# Patient Record
Sex: FEMALE | Race: BLACK OR AFRICAN AMERICAN | Employment: FULL TIME | URBAN - METROPOLITAN AREA
[De-identification: names, ages, dates, MRNs, and addresses within clinical notes are randomized per-mention and may not be internally consistent; named-entity substitution may affect disease eponyms.]

---

## 2019-02-01 ENCOUNTER — HOSPITAL ENCOUNTER (EMERGENCY)
Age: 64
Discharge: HOME | End: 2019-02-02
Attending: EMERGENCY MEDICINE
Payer: COMMERCIAL

## 2019-02-01 DIAGNOSIS — R04.0 ANTERIOR EPISTAXIS: Primary | ICD-10-CM

## 2019-02-01 PROCEDURE — 99283 EMERGENCY DEPT VISIT LOW MDM: CPT | Performed by: EMERGENCY MEDICINE

## 2019-02-02 VITALS
OXYGEN SATURATION: 100 % | DIASTOLIC BLOOD PRESSURE: 85 MMHG | HEIGHT: 61 IN | SYSTOLIC BLOOD PRESSURE: 136 MMHG | BODY MASS INDEX: 26.43 KG/M2 | RESPIRATION RATE: 16 BRPM | WEIGHT: 140 LBS | TEMPERATURE: 97.5 F | HEART RATE: 64 BPM

## 2019-02-02 NOTE — ED TRIAGE NOTES
Pt states she has had a nose bleed, on left side for 40 minutes. Pt is also complaining of left sided headache.

## 2019-02-02 NOTE — DISCHARGE INSTRUCTIONS
Humidifier. Consider salt water nasal spray. Apply small amount of antibiotic ointment twice a day to cartilage of left nostril. Consider calling her nose and throat doctor for recheck to rule out unusual causes of bleeding. Patient Education        Nosebleeds: Care Instructions  Your Care Instructions    Nosebleeds are common, especially if you have colds or allergies. Many things can cause a nosebleed. Some nosebleeds stop on their own with pressure. Others need packing. Some get cauterized (sealed). If you have gauze or other packing materials in your nose, you will need to follow up with your doctor to have the packing removed. You may need more treatment if you get nosebleeds a lot. The doctor has checked you carefully, but problems can develop later. If you notice any problems or new symptoms, get medical treatment right away. Follow-up care is a key part of your treatment and safety. Be sure to make and go to all appointments, and call your doctor if you are having problems. It's also a good idea to know your test results and keep a list of the medicines you take. How can you care for yourself at home? · If you get another nosebleed:  ? Sit up and tilt your head slightly forward. This keeps blood from going down your throat. ? Use your thumb and index finger to pinch your nose shut for 10 minutes. Use a clock. Do not check to see if the bleeding has stopped before the 10 minutes are up. If the bleeding has not stopped, pinch your nose shut for another 10 minutes. ? When the bleeding has stopped, try not to pick, rub, or blow your nose for 12 hours. Avoiding these things helps keep your nose from bleeding again. · If your doctor prescribed antibiotics, take them as directed. Do not stop taking them just because you feel better. You need to take the full course of antibiotics. To prevent nosebleeds  · Do not blow your nose too hard. · Try not to lift or strain after a nosebleed.   · Raise your head on a pillow while you sleep. · Put a thin layer of a saline- or water-based nasal gel, such as NasoGel, inside your nose. Put it on the septum, which divides your nostrils. This will prevent dryness that can cause nosebleeds. · Use a vaporizer or humidifier to add moisture to your bedroom. Follow the directions for cleaning the machine. · Do not use aspirin, ibuprofen (Advil, Motrin), or naproxen (Aleve) for 36 to 48 hours after a nosebleed unless your doctor tells you to. You can use acetaminophen (Tylenol) for pain relief. · Talk to your doctor about stopping any other medicines you are taking. Some medicines may make you more likely to get a nosebleed. · Do not use cold medicines or nasal sprays without first talking to your doctor. They can make your nose dry. When should you call for help? Call 911 anytime you think you may need emergency care. For example, call if:    · You passed out (lost consciousness).    Call your doctor now or seek immediate medical care if:    · You get another nosebleed and your nose is still bleeding after you have applied pressure 3 times for 10 minutes each time (30 minutes total).     · There is a lot of blood running down the back of your throat even after you pinch your nose and tilt your head forward.     · You have a fever.     · You have sinus pain.    Watch closely for changes in your health, and be sure to contact your doctor if:    · You get nosebleeds often, even if they stop.     · You do not get better as expected. Where can you learn more? Go to http://ade-kael.info/. Enter S156 in the search box to learn more about \"Nosebleeds: Care Instructions. \"  Current as of: September 23, 2018  Content Version: 11.9  © 1353-8805 [employer]. Care instructions adapted under license by [employer] (which disclaims liability or warranty for this information).  If you have questions about a medical condition or this instruction, always ask your healthcare professional. [place] [address] any warranty or liability for your use of this information.

## 2019-02-02 NOTE — ED PROVIDER NOTES
75-year-old female takes occasional Motrin for knee pain. No other anticoagulants. Noticed some irritation of her left nose and had some bleeding those described as dark red this evening. No posterior bleeding no cough. No persistent bleeding. Some occasional left throbbing headache. She's had occasional bouts of nosebleeds on and off for the last few months. Does have a history of hypertension. States taking medications at this point. Bleeding is always on the left. The history is provided by the patient. Epistaxis This is a new problem. The current episode started 3 to 5 hours ago. The problem has been resolved. The bleeding has been from the left nare. Her past medical history is significant for frequent nosebleeds and HTN. Her past medical history does not include bleeding disorder or sinus problems. Past Medical History:  
Diagnosis Date  Diabetes mellitus ([address] 2014  
 not on medications hGB a1C 6.5  Endometriosis   
 total hysterectomy w/o BSO  Hyperlipidemia  Hypertension   
 not taking medication Past Surgical History:  
Procedure Laterality Date  BREAST SURGERY PROCEDURE UNLISTED    
 benign lump [facility] Total for endometriosis [address] ovaries intact Family History:  
Problem Relation Age of Onset [facility] Arthritis-osteo Mother  No Known Problems Father  No Known Problems Sister  No Known Problems Brother  No Known Problems Brother  Breast Cancer Neg Hx   
 Ovarian Cancer Neg Hx  Uterine Cancer Neg Hx  Colon Cancer Neg Hx Social History Socioeconomic History  Marital status: SINGLE Spouse name: Not on file  Number of children: Not on file  Years of education: Not on file  Highest education level: Not on file Social Needs  Financial resource strain: Not on file  Food insecurity - worry: Not on file  Food insecurity - inability: Not on file  Transportation needs - medical: Not on file  Transportation needs - non-medical: Not on file Occupational History  Not on file Tobacco Use  Smoking status: Never Smoker  Smokeless tobacco: Never Used Substance and Sexual Activity  Alcohol use: No  
 Drug use: No  
 Sexual activity: Not Currently Other Topics Concern  Not on file Social History Narrative  Not on file ALLERGIES: Patient has no known allergies. Review of Systems Constitutional: Negative for chills and fever. HENT: Positive for nosebleeds. Negative for rhinorrhea, sinus pressure and sore throat. Respiratory: Negative for cough and choking. Hematological: Does not bruise/bleed easily. Vitals:  
 02/01/19 2158 02/01/19 2159 BP: 147/79 Pulse: 74 Resp: 16 Temp: 97.5 °F (36.4 °C) SpO2: 100% Weight:  63.5 kg (140 lb) Height:  5' 1\" (1.549 m) Physical Exam  
Constitutional: She appears well-developed and well-nourished. No distress. HENT:  
Nose: Epistaxis is observed. Right sinus exhibits no maxillary sinus tenderness and no frontal sinus tenderness. Left sinus exhibits no maxillary sinus tenderness and no frontal sinus tenderness. Left nasal septum with punctate area of minimal bleeding. No purulent drainage. No masses. Bleeding essentially stopped. Neosporin was applied. Eyes: EOM are normal. Pupils are equal, round, and reactive to light. Nursing note and vitals reviewed. MDM Number of Diagnoses or Management Options Diagnosis management comments: Do not believe cautery needed. Believe headache is benign. We'll refer to ENT for follow-up. Risk of Complications, Morbidity, and/or Mortality Presenting problems: low Diagnostic procedures: minimal 
Management options: low Patient Progress Patient progress: stable Procedures

## 2021-02-15 ENCOUNTER — TRANSCRIBE ORDER (OUTPATIENT)
Dept: SCHEDULING | Age: 66
End: 2021-02-15

## 2021-02-15 DIAGNOSIS — N64.4 MASTALGIA: Primary | ICD-10-CM

## 2021-02-17 ENCOUNTER — TRANSCRIBE ORDER (OUTPATIENT)
Dept: SCHEDULING | Age: 66
End: 2021-02-17

## 2021-02-17 DIAGNOSIS — N64.4 MASTALGIA: Primary | ICD-10-CM

## 2021-03-11 ENCOUNTER — HOSPITAL ENCOUNTER (OUTPATIENT)
Dept: MAMMOGRAPHY | Age: 66
Discharge: HOME | End: 2021-03-11
Attending: FAMILY MEDICINE
Payer: MEDICARE

## 2021-03-11 DIAGNOSIS — N64.4 MASTALGIA: ICD-10-CM

## 2021-03-11 PROCEDURE — 76642 ULTRASOUND BREAST LIMITED: CPT

## 2021-03-11 PROCEDURE — 77062 BREAST TOMOSYNTHESIS BI: CPT

## 2021-03-15 ENCOUNTER — APPOINTMENT (OUTPATIENT)
Dept: CT IMAGING | Age: 66
End: 2021-03-15
Attending: PHYSICIAN ASSISTANT
Payer: MEDICARE

## 2021-03-15 ENCOUNTER — HOSPITAL ENCOUNTER (EMERGENCY)
Age: 66
Discharge: HOME | End: 2021-03-15
Attending: EMERGENCY MEDICINE
Payer: MEDICARE

## 2021-03-15 VITALS
BODY MASS INDEX: 26.43 KG/M2 | HEART RATE: 64 BPM | WEIGHT: 140 LBS | HEIGHT: 61 IN | RESPIRATION RATE: 16 BRPM | OXYGEN SATURATION: 99 % | DIASTOLIC BLOOD PRESSURE: 86 MMHG | SYSTOLIC BLOOD PRESSURE: 143 MMHG | TEMPERATURE: 97.9 F

## 2021-03-15 DIAGNOSIS — R51.9 NONINTRACTABLE HEADACHE, UNSPECIFIED CHRONICITY PATTERN, UNSPECIFIED HEADACHE TYPE: Primary | ICD-10-CM

## 2021-03-15 PROCEDURE — 99283 EMERGENCY DEPT VISIT LOW MDM: CPT

## 2021-03-15 PROCEDURE — 70450 CT HEAD/BRAIN W/O DYE: CPT

## 2021-03-15 PROCEDURE — 74011250637 HC RX REV CODE- 250/637: Performed by: PHYSICIAN ASSISTANT

## 2021-03-15 RX ORDER — ACETAMINOPHEN 325 MG/1
650 TABLET ORAL
Status: COMPLETED | OUTPATIENT
Start: 2021-03-15 | End: 2021-03-15

## 2021-03-15 RX ADMIN — ACETAMINOPHEN 650 MG: 325 TABLET, FILM COATED ORAL at 12:28

## 2021-03-15 NOTE — DISCHARGE INSTRUCTIONS
Use over-the-counter meds as directed return to ER if numbness or tingling nausea vomiting blurred vision strokelike symptoms, see your primary care physician for routine recheck

## 2021-03-15 NOTE — ED PROVIDER NOTES
Complains of generalized headache for the last 2 to 3 days. She is on blood pressure medicine that she takes daily she saw her primary care doctor on Friday had routine lab work done she has not taken any over-the-counter meds to help her headache she denies any blurred vision numbness or tingling nausea or vomiting. She states she has been under little bit of stress due to recent diagnosis of a lymph node near her breast area    The history is provided by the patient. Headache   This is a new problem. The current episode started more than 2 days ago. The problem occurs constantly. The problem has not changed since onset. The headache is aggravated by an unknown factor. The pain is located in the generalized region. The quality of the pain is described as dull. The pain is at a severity of 7/10. The pain is mild. Pertinent negatives include no fever, no malaise/fatigue, no shortness of breath, no weakness, no dizziness, no visual change, no nausea and no vomiting. She has tried nothing for the symptoms. The treatment provided no relief.         Past Medical History:   Diagnosis Date    Diabetes mellitus ([facility] [address] 2014    not on medications hGB a1C 6.5    Endometriosis     total hysterectomy w/o BSO    Hyperlipidemia     Hypertension     not taking medication    Menopause        Past Surgical History:   Procedure Laterality Date    HX HYSTERECTOMY  1984    Total for endometriosis    HX TOTAL ABDOMINAL HYSTERECTOMY  1983    ovaries intact    [place] BREAST SURGERY PROCEDURE UNLISTED      benign lump         Family History:   Problem Relation Age of Onset   [facility] Arthritis-osteo Mother     No Known Problems Father     No Known Problems Sister     No Known Problems Brother     No Known Problems Brother     Breast Cancer Neg Hx     Ovarian Cancer Neg Hx     Uterine Cancer Neg Hx     Colon Cancer Neg Hx        Social History     Socioeconomic History    Marital status: SINGLE     Spouse name: Not on file    Number of children: Not on file    Years of education: Not on file    Highest education level: Not on file   Occupational History    Not on file   Social Needs    Financial resource strain: Not on file    Food insecurity     Worry: Not on file     Inability: Not on file    Transportation needs     Medical: Not on file     Non-medical: Not on file   Tobacco Use    Smoking status: Never Smoker    Smokeless tobacco: Never Used   Substance and Sexual Activity    Alcohol use: No    Drug use: No    Sexual activity: Not Currently   Lifestyle    Physical activity     Days per week: Not on file     Minutes per session: Not on file    Stress: Not on file   Relationships    Social connections     Talks on phone: Not on file     Gets together: Not on file     Attends Jewish service: Not on file     Active member of club or organization: Not on file     Attends meetings of clubs or organizations: Not on file     Relationship status: Not on file    Intimate partner violence     Fear of current or ex partner: Not on file     Emotionally abused: Not on file     Physically abused: Not on file     Forced sexual activity: Not on file   Other Topics Concern    Not on file   Social History Narrative    Not on file         ALLERGIES: Patient has no known allergies. Review of Systems   Constitutional: Negative for fever and malaise/fatigue. Respiratory: Negative for shortness of breath. Gastrointestinal: Negative for nausea and vomiting. Neurological: Positive for headaches. Negative for dizziness and weakness. All other systems reviewed and are negative. Vitals:    03/15/21 1123   BP: (!) 140/85   Pulse: 71   Resp: 18   Temp: 97.9 °F (36.6 °C)   SpO2: 100%   Weight: 63.5 kg (140 lb)   Height: 5' 1\" (1.549 m)            Physical Exam  Vitals signs and nursing note reviewed. Constitutional:       General: She is not in acute distress. Appearance: Normal appearance.  She is well-developed and normal weight. She is not diaphoretic. HENT:      Head: Normocephalic and atraumatic. Eyes:      Pupils: Pupils are equal, round, and reactive to light. Neck:      Musculoskeletal: Normal range of motion and neck supple. No neck rigidity or muscular tenderness. Cardiovascular:      Rate and Rhythm: Normal rate and regular rhythm. Pulmonary:      Effort: Pulmonary effort is normal.      Breath sounds: Normal breath sounds. Abdominal:      General: Bowel sounds are normal.      Palpations: Abdomen is soft. Musculoskeletal: Normal range of motion. Skin:     General: Skin is warm. Neurological:      General: No focal deficit present. Mental Status: She is alert and oriented to person, place, and time. Cranial Nerves: No cranial nerve deficit. Sensory: No sensory deficit. Motor: No weakness.       Coordination: Coordination normal.   Psychiatric:         Mood and Affect: Mood normal.         Behavior: Behavior normal.          MDM  Number of Diagnoses or Management Options  Diagnosis management comments: Head CT was normal patient given Tylenol for pain stress to continue at home blood pressure medicines see your primary care physician for routine recheck return to ER if dizziness blurred vision nausea vomiting or strokelike symptoms       Amount and/or Complexity of Data Reviewed  Tests in the radiology section of CPT®: ordered and reviewed  Review and summarize past medical records: yes    Risk of Complications, Morbidity, and/or Mortality  Presenting problems: moderate  Diagnostic procedures: low  Management options: low    Patient Progress  Patient progress: improved         Procedures

## 2021-03-15 NOTE — ED NOTES
I have reviewed discharge instructions with the patient. The patient verbalized understanding. Patient left ED via Discharge Method: ambulatory to Home with friend. Opportunity for questions and clarification provided. Patient given 0 scripts. To continue your aftercare when you leave the hospital, you may receive an automated call from our care team to check in on how you are doing. This is a free service and part of our promise to provide the best care and service to meet your aftercare needs.  If you have questions, or wish to unsubscribe from this service please call [phone]. Thank you for Choosing our [facility] Emergency Department.

## 2022-02-14 ENCOUNTER — TRANSCRIBE ORDER (OUTPATIENT)
Dept: SCHEDULING | Age: 67
End: 2022-02-14

## 2022-02-14 DIAGNOSIS — Z12.31 VISIT FOR SCREENING MAMMOGRAM: Primary | ICD-10-CM

## 2022-03-19 ENCOUNTER — HOSPITAL ENCOUNTER (OUTPATIENT)
Dept: MAMMOGRAPHY | Age: 67
Discharge: HOME | End: 2022-03-19
Attending: FAMILY MEDICINE
Payer: MEDICARE

## 2022-03-19 DIAGNOSIS — Z12.31 VISIT FOR SCREENING MAMMOGRAM: ICD-10-CM

## 2022-03-19 PROCEDURE — 77063 BREAST TOMOSYNTHESIS BI: CPT

## 2022-03-22 ENCOUNTER — TRANSCRIBE ORDER (OUTPATIENT)
Dept: SCHEDULING | Age: 67
End: 2022-03-22

## 2022-03-22 DIAGNOSIS — N95.9 UNSPECIFIED MENOPAUSAL AND PERIMENOPAUSAL DISORDER: Primary | ICD-10-CM

## 2022-04-08 ENCOUNTER — HOSPITAL ENCOUNTER (OUTPATIENT)
Dept: MAMMOGRAPHY | Age: 67
Discharge: HOME | End: 2022-04-08
Attending: FAMILY MEDICINE
Payer: MEDICARE

## 2022-04-08 DIAGNOSIS — N95.9 UNSPECIFIED MENOPAUSAL AND PERIMENOPAUSAL DISORDER: ICD-10-CM

## 2022-04-08 PROCEDURE — 77080 DXA BONE DENSITY AXIAL: CPT

## 2022-07-25 ENCOUNTER — HOSPITAL ENCOUNTER (EMERGENCY)
Age: 67
Discharge: HOME | End: 2022-07-25
Attending: EMERGENCY MEDICINE
Payer: MEDICARE

## 2022-07-25 ENCOUNTER — HOSPITAL ENCOUNTER (EMERGENCY)
Dept: GENERAL RADIOLOGY | Age: 67
Discharge: HOME | End: 2022-07-28
Payer: MEDICARE

## 2022-07-25 VITALS
WEIGHT: 140 LBS | HEIGHT: 59 IN | BODY MASS INDEX: 28.22 KG/M2 | SYSTOLIC BLOOD PRESSURE: 155 MMHG | RESPIRATION RATE: 18 BRPM | HEART RATE: 74 BPM | TEMPERATURE: 98.5 F | DIASTOLIC BLOOD PRESSURE: 79 MMHG | OXYGEN SATURATION: 97 %

## 2022-07-25 DIAGNOSIS — R10.84 GENERALIZED ABDOMINAL PAIN: Primary | ICD-10-CM

## 2022-07-25 DIAGNOSIS — R11.2 NON-INTRACTABLE VOMITING WITH NAUSEA, UNSPECIFIED VOMITING TYPE: ICD-10-CM

## 2022-07-25 LAB
ALBUMIN SERPL-MCNC: 3.1 G/DL (ref 3.2–4.6)
ALBUMIN/GLOB SERPL: 0.7 {RATIO} (ref 1.2–3.5)
ALP SERPL-CCNC: 62 U/L (ref 50–130)
ALT SERPL-CCNC: 26 U/L (ref 12–65)
ANION GAP SERPL CALC-SCNC: 5 MMOL/L (ref 7–16)
APPEARANCE UR: CLEAR
AST SERPL-CCNC: 27 U/L (ref 15–37)
BACTERIA URNS QL MICRO: ABNORMAL /HPF
BASOPHILS # BLD: 0 K/UL (ref 0–0.2)
BASOPHILS NFR BLD: 1 % (ref 0–2)
BILIRUB SERPL-MCNC: 0.6 MG/DL (ref 0.2–1.1)
BILIRUB UR QL: ABNORMAL
BUN SERPL-MCNC: 7 MG/DL (ref 8–23)
CALCIUM SERPL-MCNC: 9.3 MG/DL (ref 8.3–10.4)
CASTS URNS QL MICRO: ABNORMAL /LPF
CHLORIDE SERPL-SCNC: 105 MMOL/L (ref 98–107)
CO2 SERPL-SCNC: 28 MMOL/L (ref 21–32)
COLOR UR: ABNORMAL
CREAT SERPL-MCNC: 0.82 MG/DL (ref 0.6–1)
DIFFERENTIAL METHOD BLD: ABNORMAL
EOSINOPHIL # BLD: 0.1 K/UL (ref 0–0.8)
EOSINOPHIL NFR BLD: 2 % (ref 0.5–7.8)
EPI CELLS #/AREA URNS HPF: ABNORMAL /HPF
ERYTHROCYTE [DISTWIDTH] IN BLOOD BY AUTOMATED COUNT: 13.7 % (ref 11.9–14.6)
GLOBULIN SER CALC-MCNC: 4.4 G/DL (ref 2.3–3.5)
GLUCOSE SERPL-MCNC: 87 MG/DL (ref 65–100)
GLUCOSE UR STRIP.AUTO-MCNC: NEGATIVE MG/DL
HCT VFR BLD AUTO: 38.5 % (ref 35.8–46.3)
HGB BLD-MCNC: 12.3 G/DL (ref 11.7–15.4)
HGB UR QL STRIP: NEGATIVE
IMM GRANULOCYTES # BLD AUTO: 0 K/UL (ref 0–0.5)
IMM GRANULOCYTES NFR BLD AUTO: 0 % (ref 0–5)
KETONES UR QL STRIP.AUTO: 15 MG/DL
LEUKOCYTE ESTERASE UR QL STRIP.AUTO: ABNORMAL
LIPASE SERPL-CCNC: 42 U/L (ref 73–393)
LYMPHOCYTES # BLD: 1.9 K/UL (ref 0.5–4.6)
LYMPHOCYTES NFR BLD: 38 % (ref 13–44)
MCH RBC QN AUTO: 27 PG (ref 26.1–32.9)
MCHC RBC AUTO-ENTMCNC: 31.9 G/DL (ref 31.4–35)
MCV RBC AUTO: 84.6 FL (ref 79.6–97.8)
MONOCYTES # BLD: 1 K/UL (ref 0.1–1.3)
MONOCYTES NFR BLD: 21 % (ref 4–12)
MUCOUS THREADS URNS QL MICRO: ABNORMAL /LPF
NEUTS SEG # BLD: 1.9 K/UL (ref 1.7–8.2)
NEUTS SEG NFR BLD: 38 % (ref 43–78)
NITRITE UR QL STRIP.AUTO: NEGATIVE
NRBC # BLD: 0 K/UL (ref 0–0.2)
OTHER OBSERVATIONS: ABNORMAL
PH UR STRIP: 5.5 [PH] (ref 5–9)
PLATELET # BLD AUTO: 268 K/UL (ref 150–450)
PMV BLD AUTO: 8.9 FL (ref 9.4–12.3)
POTASSIUM SERPL-SCNC: 3.6 MMOL/L (ref 3.5–5.1)
PROT SERPL-MCNC: 7.5 G/DL (ref 6.3–8.2)
PROT UR STRIP-MCNC: 30 MG/DL
RBC # BLD AUTO: 4.55 M/UL (ref 4.05–5.2)
RBC #/AREA URNS HPF: ABNORMAL /HPF
SODIUM SERPL-SCNC: 138 MMOL/L (ref 136–145)
SP GR UR REFRACTOMETRY: 1.03 (ref 1–1.02)
UROBILINOGEN UR QL STRIP.AUTO: 1 EU/DL (ref 0.2–1)
WBC # BLD AUTO: 4.9 K/UL (ref 4.3–11.1)
WBC URNS QL MICRO: ABNORMAL /HPF

## 2022-07-25 PROCEDURE — 96374 THER/PROPH/DIAG INJ IV PUSH: CPT

## 2022-07-25 PROCEDURE — 96375 TX/PRO/DX INJ NEW DRUG ADDON: CPT

## 2022-07-25 PROCEDURE — 96361 HYDRATE IV INFUSION ADD-ON: CPT

## 2022-07-25 PROCEDURE — 87086 URINE CULTURE/COLONY COUNT: CPT

## 2022-07-25 PROCEDURE — 2580000003 HC RX 258: Performed by: EMERGENCY MEDICINE

## 2022-07-25 PROCEDURE — 74022 RADEX COMPL AQT ABD SERIES: CPT

## 2022-07-25 PROCEDURE — 6360000002 HC RX W HCPCS: Performed by: EMERGENCY MEDICINE

## 2022-07-25 PROCEDURE — 99284 EMERGENCY DEPT VISIT MOD MDM: CPT

## 2022-07-25 PROCEDURE — 81001 URINALYSIS AUTO W/SCOPE: CPT

## 2022-07-25 PROCEDURE — 85025 COMPLETE CBC W/AUTO DIFF WBC: CPT

## 2022-07-25 PROCEDURE — 83690 ASSAY OF LIPASE: CPT

## 2022-07-25 PROCEDURE — 80053 COMPREHEN METABOLIC PANEL: CPT

## 2022-07-25 RX ORDER — ONDANSETRON 2 MG/ML
4 INJECTION INTRAMUSCULAR; INTRAVENOUS
Status: COMPLETED | OUTPATIENT
Start: 2022-07-25 | End: 2022-07-25

## 2022-07-25 RX ORDER — ONDANSETRON 4 MG/1
4 TABLET, ORALLY DISINTEGRATING ORAL 3 TIMES DAILY PRN
Qty: 21 TABLET | Refills: 0 | Status: SHIPPED | OUTPATIENT
Start: 2022-07-25

## 2022-07-25 RX ORDER — MORPHINE SULFATE 4 MG/ML
4 INJECTION, SOLUTION INTRAMUSCULAR; INTRAVENOUS
Status: COMPLETED | OUTPATIENT
Start: 2022-07-25 | End: 2022-07-25

## 2022-07-25 RX ORDER — 0.9 % SODIUM CHLORIDE 0.9 %
1000 INTRAVENOUS SOLUTION INTRAVENOUS
Status: COMPLETED | OUTPATIENT
Start: 2022-07-25 | End: 2022-07-25

## 2022-07-25 RX ORDER — PENICILLIN V POTASSIUM 500 MG/1
500 TABLET ORAL 4 TIMES DAILY
COMMUNITY
End: 2022-08-10

## 2022-07-25 RX ORDER — ROSUVASTATIN CALCIUM 5 MG/1
5 TABLET, COATED ORAL DAILY
COMMUNITY
Start: 2022-03-15

## 2022-07-25 RX ORDER — AMOXICILLIN 500 MG/1
500 CAPSULE ORAL 3 TIMES DAILY
COMMUNITY
End: 2022-08-10

## 2022-07-25 RX ORDER — CLINDAMYCIN HYDROCHLORIDE 300 MG/1
300 CAPSULE ORAL 3 TIMES DAILY
COMMUNITY
End: 2022-08-10

## 2022-07-25 RX ADMIN — SODIUM CHLORIDE 1000 ML: 9 INJECTION, SOLUTION INTRAVENOUS at 08:41

## 2022-07-25 RX ADMIN — MORPHINE SULFATE 4 MG: 4 INJECTION INTRAVENOUS at 12:10

## 2022-07-25 RX ADMIN — ONDANSETRON 4 MG: 2 INJECTION INTRAMUSCULAR; INTRAVENOUS at 11:55

## 2022-07-25 ASSESSMENT — ENCOUNTER SYMPTOMS
BACK PAIN: 0
COUGH: 0
NAUSEA: 1
SHORTNESS OF BREATH: 0
DIARRHEA: 1
SORE THROAT: 0
CONSTIPATION: 1
COLOR CHANGE: 0
VOMITING: 1
ABDOMINAL PAIN: 1
CRAMPS: 1
RHINORRHEA: 0

## 2022-07-25 ASSESSMENT — PAIN DESCRIPTION - LOCATION: LOCATION: ABDOMEN

## 2022-07-25 ASSESSMENT — PAIN DESCRIPTION - DESCRIPTORS: DESCRIPTORS: ACHING

## 2022-07-25 ASSESSMENT — PAIN DESCRIPTION - ORIENTATION: ORIENTATION: MID

## 2022-07-25 ASSESSMENT — PAIN SCALES - GENERAL: PAINLEVEL_OUTOF10: 9

## 2022-07-25 NOTE — ED TRIAGE NOTES
Pt states that 3 weeks ago, she had 3 teeth pulled. Pt states that she was started on some antibiotics but since then she's had abdominal cramping, constipation, and vomiting. Pt has tried OTC medication for constipation relief but no success.

## 2022-07-25 NOTE — ED PROVIDER NOTES
[facility] Emergency Department Provider Note                   PCP:                Abdifatah Velasquez MD               Age: 77 y.o. Sex: female       ICD-10-CM    1. Generalized abdominal pain  R10.84       2. Non-intractable vomiting with nausea, unspecified vomiting type  R11.2           DISPOSITION Decision To Discharge 07/25/2022 11:47:31 AM       MDM  Number of Diagnoses or Management Options  Generalized abdominal pain  Non-intractable vomiting with nausea, unspecified vomiting type  Diagnosis management comments: Patient with abdominal pain and nausea and vomiting after taking multiple medications from her recent tooth surgery. No acute on x-ray or blood work here. We will have her stop all medications and take Zofran only for nausea and vomiting. Amount and/or Complexity of Data Reviewed  Clinical lab tests: ordered and reviewed  Tests in the radiology section of CPT®: reviewed and ordered  Tests in the medicine section of CPT®: ordered and reviewed    Patient Progress  Patient progress: stable       Orders Placed This Encounter   Procedures    Culture, Urine    XR ACUTE ABD SERIES CHEST 1 VW    CBC with Auto Differential    Comprehensive Metabolic Panel    Lipase    Urinalysis        Lata Alcantara is a 77 y.o. female who presents to the Emergency Department with chief complaint of    Chief Complaint   Patient presents with    Constipation    Abdominal Cramping      1 month ago patient had a tooth pulled. 2 weeks ago had a dry socket. Has been on multiple antibiotics and over-the-counter pain medications. For the past 2 to 3 weeks has had abdominal cramping and bloating. Intermittent diarrhea and constipation. Has taken both Imodium and Pepto-Bismol. Has stopped her antibiotics and still with intermittent abdominal cramping so came here. The history is provided by the patient. No  was used. Abdominal Cramping  This is a new problem.  The current episode started more than 1 week ago. The problem occurs daily. The problem has been gradually worsening. Associated symptoms include abdominal pain. Pertinent negatives include no chest pain, no headaches and no shortness of breath. Nothing aggravates the symptoms. Nothing relieves the symptoms. She has tried nothing for the symptoms. All other systems reviewed and are negative. Review of Systems   Constitutional:  Negative for chills and fever. HENT:  Negative for rhinorrhea and sore throat. Respiratory:  Negative for cough and shortness of breath. Cardiovascular:  Negative for chest pain and palpitations. Gastrointestinal:  Positive for abdominal pain, constipation, diarrhea, nausea and vomiting. Genitourinary:  Negative for dysuria and hematuria. Musculoskeletal:  Negative for back pain and neck pain. Skin:  Negative for color change and rash. Neurological:  Negative for numbness and headaches. All other systems reviewed and are negative.     Past Medical History:   Diagnosis Date    Diabetes mellitus ([facility] [address] 2014    not on medications hGB a1C 6.5    Endometriosis     total hysterectomy w/o BSO    Hyperlipidemia     Hypertension     not taking medication    Menopause         Past Surgical History:   Procedure Laterality Date    BREAST BIOPSY      BREAST SURGERY      benign lump    HYSTERECTOMY (CERVIX STATUS UNKNOWN)  1984    Total for endometriosis    HYSTERECTOMY, TOTAL ABDOMINAL (CERVIX REMOVED)  1983    ovaries intact        Family History   Problem Relation Age of Onset    Colon Cancer Neg Hx     Ovarian Cancer Neg Hx     Breast Cancer Neg Hx     No Known Problems Brother     No Known Problems Brother     No Known Problems Sister     No Known Problems Father     Osteoarthritis Mother     Uterine Cancer Neg Hx         Social History     Socioeconomic History    Marital status: Single     Spouse name: None    Number of children: None    Years of education: None    Highest education level: None Tobacco Use    Smoking status: Never    Smokeless tobacco: Never   Substance and Sexual Activity    Alcohol use: No    Drug use: No        Allergies: Patient has no known allergies. Previous Medications    AMOXICILLIN (AMOXIL) 500 MG CAPSULE    Take 500 mg by mouth in the morning and 500 mg at noon and 500 mg before bedtime. CLINDAMYCIN (CLEOCIN) 300 MG CAPSULE    Take 300 mg by mouth in the morning and 300 mg at noon and 300 mg before bedtime. LISINOPRIL-HYDROCHLOROTHIAZIDE (PRINZIDE;ZESTORETIC) 20-25 MG PER TABLET    Take 1 tablet by mouth daily    MELOXICAM (MOBIC) 7.5 MG TABLET    Take 7.5 mg by mouth daily as needed    PENICILLIN V POTASSIUM (VEETID) 500 MG TABLET    Take 500 mg by mouth in the morning and 500 mg at noon and 500 mg in the evening and 500 mg before bedtime. ROSUVASTATIN (CRESTOR) 5 MG TABLET    Take 5 mg by mouth in the morning. Vitals signs and nursing note reviewed. Patient Vitals for the past 4 hrs:   Temp Pulse Resp BP SpO2   07/25/22 0922 -- 79 18 (!) 156/81 100 %   07/25/22 0809 98.5 °F (36.9 °C) 94 18 (!) 155/85 98 %          Physical Exam  Vitals and nursing note reviewed. Constitutional:       Appearance: Normal appearance. HENT:      Head: Normocephalic and atraumatic. Cardiovascular:      Rate and Rhythm: Normal rate and regular rhythm. Pulmonary:      Effort: Pulmonary effort is normal.      Breath sounds: Normal breath sounds. No wheezing. Abdominal:      General: Bowel sounds are normal.      Palpations: Abdomen is soft. Tenderness: There is no abdominal tenderness. Musculoskeletal:         General: No swelling. Normal range of motion. Cervical back: Normal range of motion. No tenderness. Skin:     General: Skin is warm and dry. Neurological:      Mental Status: She is alert.         Procedures      Labs Reviewed   CBC WITH AUTO DIFFERENTIAL - Abnormal; Notable for the following components:       Result Value    MPV 8.9 (*)     Seg Neutrophils 38 (*)     Monocytes 21 (*)     All other components within normal limits   COMPREHENSIVE METABOLIC PANEL - Abnormal; Notable for the following components:    Anion Gap 5 (*)     BUN 7 (*)     Albumin 3.1 (*)     Globulin 4.4 (*)     Albumin/Globulin Ratio 0.7 (*)     All other components within normal limits   LIPASE - Abnormal; Notable for the following components:    Lipase 42 (*)     All other components within normal limits   URINALYSIS - Abnormal; Notable for the following components:    Specific Gravity, UA 1.029 (*)     Protein, UA 30 (*)     Ketones, Urine 15 (*)     Bilirubin Urine MODERATE (*)     Leukocyte Esterase, Urine TRACE (*)     Mucus, UA 4+ (*)     All other components within normal limits   CULTURE, URINE        XR ACUTE ABD SERIES CHEST 1 VW   Final Result   1. No acute finding in the chest or abdomen. Voice dictation software was used during the making of this note. This software is not perfect and grammatical and other typographical errors may be present. This note has not been completely proofread for errors.      Saeid Roberts MD  07/25/22 David Chun MD  07/25/22 0343

## 2022-07-25 NOTE — Clinical Note
Gabe Alston was seen and treated in our emergency department on 7/25/2022. She may return to work on 07/30/2022. If you have any questions or concerns, please don't hesitate to call.       Brinda Encinas MD

## 2022-07-25 NOTE — Clinical Note
Tamara Stevenson was seen and treated in our emergency department on 7/25/2022. She may return to work on 07/30/2022. If you have any questions or concerns, please don't hesitate to call.       Lyndsay Mccray MD

## 2022-07-27 LAB
BACTERIA SPEC CULT: NORMAL
SERVICE CMNT-IMP: NORMAL

## 2022-08-10 ENCOUNTER — HOSPITAL ENCOUNTER (EMERGENCY)
Dept: CT IMAGING | Age: 67
Discharge: HOME | DRG: 872 | End: 2022-08-13
Payer: MEDICARE

## 2022-08-10 ENCOUNTER — HOSPITAL ENCOUNTER (INPATIENT)
Age: 67
LOS: 4 days | Discharge: HOME HEALTH | DRG: 872 | End: 2022-08-14
Attending: STUDENT IN AN ORGANIZED HEALTH CARE EDUCATION/TRAINING PROGRAM | Admitting: FAMILY MEDICINE
Payer: MEDICARE

## 2022-08-10 ENCOUNTER — HOSPITAL ENCOUNTER (EMERGENCY)
Dept: GENERAL RADIOLOGY | Age: 67
Discharge: HOME | DRG: 872 | End: 2022-08-13
Payer: MEDICARE

## 2022-08-10 DIAGNOSIS — N17.9 AKI (ACUTE KIDNEY INJURY) (HCC): ICD-10-CM

## 2022-08-10 DIAGNOSIS — K35.20 ACUTE APPENDICITIS WITH GENERALIZED PERITONITIS, UNSPECIFIED WHETHER ABSCESS PRESENT, UNSPECIFIED WHETHER GANGRENE PRESENT, UNSPECIFIED WHETHER PERFORATION PRESENT: ICD-10-CM

## 2022-08-10 DIAGNOSIS — K52.9 COLITIS: Primary | ICD-10-CM

## 2022-08-10 DIAGNOSIS — D72.829 LEUKOCYTOSIS, UNSPECIFIED TYPE: ICD-10-CM

## 2022-08-10 PROBLEM — E87.8 DISORDER OF FLUID OR ELECTROLYTE: Status: ACTIVE | Noted: 2022-08-10

## 2022-08-10 PROBLEM — E87.1 HYPONATREMIA: Status: ACTIVE | Noted: 2022-08-10

## 2022-08-10 PROBLEM — A41.9 SEPSIS WITHOUT SEPTIC SHOCK (HCC): Status: ACTIVE | Noted: 2022-08-10

## 2022-08-10 LAB
ALBUMIN SERPL-MCNC: 2.8 G/DL (ref 3.2–4.6)
ALBUMIN/GLOB SERPL: 0.5 {RATIO} (ref 1.2–3.5)
ALP SERPL-CCNC: 100 U/L (ref 50–136)
ALT SERPL-CCNC: 16 U/L (ref 12–65)
ANION GAP SERPL CALC-SCNC: 4 MMOL/L (ref 7–16)
AST SERPL-CCNC: 16 U/L (ref 15–37)
BASOPHILS # BLD: 0.2 K/UL (ref 0–0.2)
BASOPHILS NFR BLD: 1 % (ref 0–2)
BILIRUB SERPL-MCNC: 0.6 MG/DL (ref 0.2–1.1)
BUN SERPL-MCNC: 32 MG/DL (ref 8–23)
CALCIUM SERPL-MCNC: 9.9 MG/DL (ref 8.3–10.4)
CHLORIDE SERPL-SCNC: 95 MMOL/L (ref 98–107)
CO2 SERPL-SCNC: 33 MMOL/L (ref 21–32)
CREAT SERPL-MCNC: 1.11 MG/DL (ref 0.6–1)
DIFFERENTIAL METHOD BLD: ABNORMAL
EOSINOPHIL # BLD: 0 K/UL (ref 0–0.8)
EOSINOPHIL NFR BLD: 0 % (ref 0.5–7.8)
ERYTHROCYTE [DISTWIDTH] IN BLOOD BY AUTOMATED COUNT: 14 % (ref 11.9–14.6)
GLOBULIN SER CALC-MCNC: 5.1 G/DL (ref 2.3–3.5)
GLUCOSE SERPL-MCNC: 126 MG/DL (ref 65–100)
HCT VFR BLD AUTO: 44 % (ref 35.8–46.3)
HGB BLD-MCNC: 14.2 G/DL (ref 11.7–15.4)
IMM GRANULOCYTES # BLD AUTO: 0.3 K/UL (ref 0–0.5)
IMM GRANULOCYTES NFR BLD AUTO: 1 % (ref 0–5)
LACTATE SERPL-SCNC: 1.6 MMOL/L (ref 0.4–2)
LIPASE SERPL-CCNC: 31 U/L (ref 73–393)
LYMPHOCYTES # BLD: 1 K/UL (ref 0.5–4.6)
LYMPHOCYTES NFR BLD: 4 % (ref 13–44)
MCH RBC QN AUTO: 26.4 PG (ref 26.1–32.9)
MCHC RBC AUTO-ENTMCNC: 32.3 G/DL (ref 31.4–35)
MCV RBC AUTO: 81.8 FL (ref 79.6–97.8)
MONOCYTES # BLD: 3.3 K/UL (ref 0.1–1.3)
MONOCYTES NFR BLD: 14 % (ref 4–12)
NEUTS SEG # BLD: 19 K/UL (ref 1.7–8.2)
NEUTS SEG NFR BLD: 80 % (ref 43–78)
NRBC # BLD: 0 K/UL (ref 0–0.2)
PLATELET # BLD AUTO: 486 K/UL (ref 150–450)
PMV BLD AUTO: 9.2 FL (ref 9.4–12.3)
POTASSIUM SERPL-SCNC: 3.6 MMOL/L (ref 3.5–5.1)
PROT SERPL-MCNC: 7.9 G/DL (ref 6.3–8.2)
RBC # BLD AUTO: 5.38 M/UL (ref 4.05–5.2)
SODIUM SERPL-SCNC: 132 MMOL/L (ref 136–145)
WBC # BLD AUTO: 23.8 K/UL (ref 4.3–11.1)

## 2022-08-10 PROCEDURE — 2580000003 HC RX 258: Performed by: FAMILY MEDICINE

## 2022-08-10 PROCEDURE — 2580000003 HC RX 258

## 2022-08-10 PROCEDURE — 80053 COMPREHEN METABOLIC PANEL: CPT

## 2022-08-10 PROCEDURE — 87040 BLOOD CULTURE FOR BACTERIA: CPT

## 2022-08-10 PROCEDURE — 6360000002 HC RX W HCPCS: Performed by: FAMILY MEDICINE

## 2022-08-10 PROCEDURE — 96375 TX/PRO/DX INJ NEW DRUG ADDON: CPT

## 2022-08-10 PROCEDURE — 96374 THER/PROPH/DIAG INJ IV PUSH: CPT

## 2022-08-10 PROCEDURE — 85025 COMPLETE CBC W/AUTO DIFF WBC: CPT

## 2022-08-10 PROCEDURE — 83690 ASSAY OF LIPASE: CPT

## 2022-08-10 PROCEDURE — 99285 EMERGENCY DEPT VISIT HI MDM: CPT

## 2022-08-10 PROCEDURE — 6360000002 HC RX W HCPCS

## 2022-08-10 PROCEDURE — 83605 ASSAY OF LACTIC ACID: CPT

## 2022-08-10 PROCEDURE — 74177 CT ABD & PELVIS W/CONTRAST: CPT

## 2022-08-10 PROCEDURE — 2500000003 HC RX 250 WO HCPCS

## 2022-08-10 PROCEDURE — 71045 X-RAY EXAM CHEST 1 VIEW: CPT

## 2022-08-10 PROCEDURE — 6360000004 HC RX CONTRAST MEDICATION

## 2022-08-10 PROCEDURE — 1100000000 HC RM PRIVATE

## 2022-08-10 RX ORDER — MORPHINE SULFATE 2 MG/ML
2 INJECTION, SOLUTION INTRAMUSCULAR; INTRAVENOUS EVERY 4 HOURS PRN
Status: DISCONTINUED | OUTPATIENT
Start: 2022-08-10 | End: 2022-08-14 | Stop reason: HOSPADM

## 2022-08-10 RX ORDER — SODIUM CHLORIDE, SODIUM LACTATE, POTASSIUM CHLORIDE, CALCIUM CHLORIDE 600; 310; 30; 20 MG/100ML; MG/100ML; MG/100ML; MG/100ML
INJECTION, SOLUTION INTRAVENOUS CONTINUOUS
Status: DISCONTINUED | OUTPATIENT
Start: 2022-08-10 | End: 2022-08-12

## 2022-08-10 RX ORDER — METRONIDAZOLE 500 MG/100ML
500 INJECTION, SOLUTION INTRAVENOUS
Status: COMPLETED | OUTPATIENT
Start: 2022-08-10 | End: 2022-08-10

## 2022-08-10 RX ORDER — ONDANSETRON 2 MG/ML
4 INJECTION INTRAMUSCULAR; INTRAVENOUS EVERY 6 HOURS PRN
Status: DISCONTINUED | OUTPATIENT
Start: 2022-08-10 | End: 2022-08-14 | Stop reason: HOSPADM

## 2022-08-10 RX ORDER — 0.9 % SODIUM CHLORIDE 0.9 %
100 INTRAVENOUS SOLUTION INTRAVENOUS
Status: COMPLETED | OUTPATIENT
Start: 2022-08-10 | End: 2022-08-10

## 2022-08-10 RX ORDER — POTASSIUM CHLORIDE 7.45 MG/ML
10 INJECTION INTRAVENOUS PRN
Status: DISCONTINUED | OUTPATIENT
Start: 2022-08-10 | End: 2022-08-14 | Stop reason: HOSPADM

## 2022-08-10 RX ORDER — POTASSIUM CHLORIDE 20 MEQ/1
40 TABLET, EXTENDED RELEASE ORAL PRN
Status: DISCONTINUED | OUTPATIENT
Start: 2022-08-10 | End: 2022-08-14 | Stop reason: HOSPADM

## 2022-08-10 RX ORDER — 0.9 % SODIUM CHLORIDE 0.9 %
1000 INTRAVENOUS SOLUTION INTRAVENOUS ONCE
Status: COMPLETED | OUTPATIENT
Start: 2022-08-10 | End: 2022-08-10

## 2022-08-10 RX ORDER — ACETAMINOPHEN 650 MG/1
650 SUPPOSITORY RECTAL EVERY 6 HOURS PRN
Status: DISCONTINUED | OUTPATIENT
Start: 2022-08-10 | End: 2022-08-11

## 2022-08-10 RX ORDER — SODIUM CHLORIDE 0.9 % (FLUSH) 0.9 %
10 SYRINGE (ML) INJECTION
Status: DISCONTINUED | OUTPATIENT
Start: 2022-08-10 | End: 2022-08-14 | Stop reason: HOSPADM

## 2022-08-10 RX ORDER — ONDANSETRON 4 MG/1
4 TABLET, ORALLY DISINTEGRATING ORAL EVERY 8 HOURS PRN
Status: DISCONTINUED | OUTPATIENT
Start: 2022-08-10 | End: 2022-08-14 | Stop reason: HOSPADM

## 2022-08-10 RX ORDER — SODIUM CHLORIDE 0.9 % (FLUSH) 0.9 %
5-40 SYRINGE (ML) INJECTION EVERY 12 HOURS SCHEDULED
Status: DISCONTINUED | OUTPATIENT
Start: 2022-08-10 | End: 2022-08-11

## 2022-08-10 RX ORDER — POLYETHYLENE GLYCOL 3350 17 G/17G
17 POWDER, FOR SOLUTION ORAL DAILY PRN
Status: DISCONTINUED | OUTPATIENT
Start: 2022-08-10 | End: 2022-08-11

## 2022-08-10 RX ORDER — MAGNESIUM SULFATE IN WATER 40 MG/ML
2000 INJECTION, SOLUTION INTRAVENOUS PRN
Status: DISCONTINUED | OUTPATIENT
Start: 2022-08-10 | End: 2022-08-14 | Stop reason: HOSPADM

## 2022-08-10 RX ORDER — ACETAMINOPHEN 325 MG/1
650 TABLET ORAL EVERY 6 HOURS PRN
Status: DISCONTINUED | OUTPATIENT
Start: 2022-08-10 | End: 2022-08-14 | Stop reason: HOSPADM

## 2022-08-10 RX ORDER — SODIUM CHLORIDE 9 MG/ML
INJECTION, SOLUTION INTRAVENOUS PRN
Status: DISCONTINUED | OUTPATIENT
Start: 2022-08-10 | End: 2022-08-11

## 2022-08-10 RX ORDER — ROSUVASTATIN CALCIUM 5 MG/1
5 TABLET, COATED ORAL DAILY
Status: DISCONTINUED | OUTPATIENT
Start: 2022-08-11 | End: 2022-08-11

## 2022-08-10 RX ORDER — ONDANSETRON 2 MG/ML
4 INJECTION INTRAMUSCULAR; INTRAVENOUS
Status: COMPLETED | OUTPATIENT
Start: 2022-08-10 | End: 2022-08-10

## 2022-08-10 RX ORDER — SODIUM CHLORIDE 0.9 % (FLUSH) 0.9 %
5-40 SYRINGE (ML) INJECTION PRN
Status: DISCONTINUED | OUTPATIENT
Start: 2022-08-10 | End: 2022-08-14 | Stop reason: HOSPADM

## 2022-08-10 RX ORDER — ENOXAPARIN SODIUM 100 MG/ML
40 INJECTION SUBCUTANEOUS EVERY 24 HOURS
Status: DISCONTINUED | OUTPATIENT
Start: 2022-08-10 | End: 2022-08-11

## 2022-08-10 RX ORDER — HYDROMORPHONE HYDROCHLORIDE 1 MG/ML
0.25 INJECTION, SOLUTION INTRAMUSCULAR; INTRAVENOUS; SUBCUTANEOUS
Status: COMPLETED | OUTPATIENT
Start: 2022-08-10 | End: 2022-08-10

## 2022-08-10 RX ADMIN — METRONIDAZOLE 500 MG: 500 INJECTION, SOLUTION INTRAVENOUS at 18:03

## 2022-08-10 RX ADMIN — CEFEPIME 2000 MG: 2 INJECTION, POWDER, FOR SOLUTION INTRAVENOUS at 15:43

## 2022-08-10 RX ADMIN — IOPAMIDOL 100 ML: 755 INJECTION, SOLUTION INTRAVENOUS at 14:13

## 2022-08-10 RX ADMIN — SODIUM CHLORIDE, PRESERVATIVE FREE 10 ML: 5 INJECTION INTRAVENOUS at 20:46

## 2022-08-10 RX ADMIN — MORPHINE SULFATE 2 MG: 2 INJECTION, SOLUTION INTRAMUSCULAR; INTRAVENOUS at 20:45

## 2022-08-10 RX ADMIN — SODIUM CHLORIDE 100 ML: 9 INJECTION, SOLUTION INTRAVENOUS at 14:13

## 2022-08-10 RX ADMIN — HYDROMORPHONE HYDROCHLORIDE 0.25 MG: 1 INJECTION, SOLUTION INTRAMUSCULAR; INTRAVENOUS; SUBCUTANEOUS at 13:07

## 2022-08-10 RX ADMIN — SODIUM CHLORIDE 1000 ML: 9 INJECTION, SOLUTION INTRAVENOUS at 14:58

## 2022-08-10 RX ADMIN — ONDANSETRON 4 MG: 2 INJECTION INTRAMUSCULAR; INTRAVENOUS at 13:02

## 2022-08-10 RX ADMIN — SODIUM CHLORIDE 1000 ML: 9 INJECTION, SOLUTION INTRAVENOUS at 13:01

## 2022-08-10 RX ADMIN — SODIUM CHLORIDE, SODIUM LACTATE, POTASSIUM CHLORIDE, AND CALCIUM CHLORIDE: 600; 310; 30; 20 INJECTION, SOLUTION INTRAVENOUS at 19:02

## 2022-08-10 ASSESSMENT — PAIN SCALES - GENERAL
PAINLEVEL_OUTOF10: 0
PAINLEVEL_OUTOF10: 8
PAINLEVEL_OUTOF10: 9
PAINLEVEL_OUTOF10: 9

## 2022-08-10 ASSESSMENT — PAIN DESCRIPTION - DESCRIPTORS
DESCRIPTORS: ACHING;CRAMPING
DESCRIPTORS: ACHING;GNAWING;NAGGING

## 2022-08-10 ASSESSMENT — ENCOUNTER SYMPTOMS
SORE THROAT: 0
COUGH: 0
COLOR CHANGE: 0
SHORTNESS OF BREATH: 0
ABDOMINAL PAIN: 1
VOMITING: 1
NAUSEA: 1
DIARRHEA: 1

## 2022-08-10 ASSESSMENT — PAIN DESCRIPTION - LOCATION
LOCATION: ABDOMEN

## 2022-08-10 NOTE — ED PROVIDER NOTES
[facility] Emergency Department Provider Note                   PCP:                Sherren Providence, MD               Age: 77 y.o. Sex: female       ICD-10-CM    1. Colitis  K52.9       2. Acute appendicitis with generalized peritonitis, unspecified whether abscess present, unspecified whether gangrene present, unspecified whether perforation present  K35.20     Possible appendicitis on CT      3. MILLA (acute kidney injury) ([facility] [address]  N17.9       4. Leukocytosis, unspecified type  D72.829           DISPOSITION Admitted 08/10/2022 03:20:22 PM        MDM  Number of Diagnoses or Management Options  Acute appendicitis with generalized peritonitis, unspecified whether abscess present, unspecified whether gangrene present, unspecified whether perforation present  MILLA (acute kidney injury) ([facility] [address]  Colitis  Leukocytosis, unspecified type  Diagnosis management comments: Vital signs reviewed, patient stable, NAD, afebrile, nontoxic in appearance     Will obtain CBC, CMP, lipase, lactic acid, urine dip  Will administer 1 L normal saline, IV Zofran, IV analgesics  Will order stool culture if patient can give us a sample. Patient diffusely tender to palpation throughout her abdomen with guarding. Abdomen is soft no rebound noted. Lungs are clear to auscultation bilaterally. No erythema of the posterior oropharynx, mucous membranes are moist    Differential includes, not limited to ,small bowel obstruction, colitis, appendicitis, pancreatitis, cholecystitis, cystitis  Will obtain CT abdomen pelvis with IV contrast due to diffuse tenderness to palpation with guarding    CT ABDOMEN PELVIS W IV CONTRAST Additional Contrast? None   Final Result    1. Diffuse colonic wall thickening suggests an infectious/inflammatory colitis,    including C. difficile. 2. Small amount of ascites. 3. Blind-ending fluid-filled tubular structure could represent a dilated    appendix without surrounding inflammatory change.  There could conceivably be    obstruction of the appendiceal orifice due to the extensive mucosal thickening. Secondary acute appendicitis is not excluded    4. Prominent mesenteric lymph nodes within the lower abdomen, most likely    reactive. XR CHEST PORTABLE   Final Result    No acute cardiopulmonary abnormality. Lipase within normal limits  CMP consistent with an MILLA with a BUN of 32, creatinine 1.11, GFR 52  CBC demonstrates a white count of 23.8  Lactic acid 1.6    Will obtain blood cultures x2  Will administer IV Flagyl and cefepime  Will start another liter of fluids conservatively over 2 hours    I spoke with Dr. Meli Acevedo, the hospitalist, in person regarding admission of this patient. He informed to give them a message and [employer] when CT abdomen pelvis results were resulted. Contacted Dr. Meli Acevedo with CT abdomen pelvis results and the possible concern for appendicitis along with colitis. Asked him if he would like me to consult general surgery. Dr. Meli Acevedo said that he would take care of that. Patient is currently stable at this time. She is to be admitted to the hospital under Dr. Noa Carrillo care. I discussed this patient with my attending, Dr. Ron Yip, who is in agreement with treatment and disposition.        Amount and/or Complexity of Data Reviewed  Clinical lab tests: ordered and reviewed  Tests in the radiology section of CPT®: ordered and reviewed  Review and summarize past medical records: yes  Discuss the patient with other providers: yes  Independent visualization of images, tracings, or specimens: yes (Independent visualization of imaging)    Risk of Complications, Morbidity, and/or Mortality  Presenting problems: high  Diagnostic procedures: high  Management options: high    Patient Progress  Patient progress: stable       Orders Placed This Encounter   Procedures    Culture, Stool    Culture, Blood 1    Culture, Blood 1    Clostridium Difficile Toxin/Antigen    CT ABDOMEN PELVIS W IV CONTRAST Additional Contrast? None    XR CHEST PORTABLE    CBC with Diff    CMP    Lipase    Lactic Acid (Select if patient is over 65 to rule out mesenteric ischemia)    Comprehensive Metabolic Panel w/ Reflex to MG    CBC with Auto Differential    Phosphorus    Diet NPO    POCT Urine Dipstick    Enteric Contact Isolation    Saline lock IV    ADMIT TO INPATIENT        Jan Merida is a 77 y.o. female who presents to the Emergency Department with chief complaint of    Chief Complaint   Patient presents with    Abdominal Pain      51-year-old female with history of hypertension, hyper dyslipidemia, diabetes, hysterectomy presents to the emergency department today with chief complaint of diffuse abdominal pain, vomiting, diarrhea. Patient states she had a tooth pulled around the week of July 4 and was placed on clindamycin. Since then patient has had diffuse abdominal pain with some diarrhea and nausea. Patient was seen in the emergency department 7/25/22 for same complaint with no acute findings on abdominal x-ray imaging or lab work. patient states that for the past 3 weeks she is unable to keep fluids down and is having small amounts of frequent diarrhea. Patient was seen by her primary care provider 2 days ago a stool sample was taken and sent for culture. Time patient denies fevers, chills, chest pain, shortness of breath, sore throat, cough, dysuria. Nothing makes patient's condition better. Nothing makes patient's condition worse. Zofran and Lomotil tried without relief. The history is provided by the patient. No  was used. Review of Systems   Constitutional:  Negative for chills, fatigue and fever. HENT:  Negative for ear pain and sore throat. Respiratory:  Negative for cough and shortness of breath. Cardiovascular:  Negative for chest pain and palpitations. Gastrointestinal:  Positive for abdominal pain, diarrhea, nausea and vomiting. Genitourinary:  Negative for dysuria. Skin:  Negative for color change. Neurological:  Negative for weakness and headaches. All other systems reviewed and are negative. Past Medical History:   Diagnosis Date    Diabetes mellitus ([address] 2014    not on medications hGB a1C 6.5    Endometriosis     total hysterectomy w/o BSO    Hyperlipidemia     Hypertension     not taking medication    Menopause         Past Surgical History:   Procedure Laterality Date    BREAST BIOPSY      BREAST SURGERY      benign lump    HYSTERECTOMY (CERVIX STATUS UNKNOWN)  1984    Total for endometriosis    HYSTERECTOMY, TOTAL ABDOMINAL (CERVIX REMOVED)  1983    ovaries intact        Family History   Problem Relation Age of Onset    Colon Cancer Neg Hx     Ovarian Cancer Neg Hx     Breast Cancer Neg Hx     No Known Problems Brother     No Known Problems Brother     No Known Problems Sister     No Known Problems Father     Osteoarthritis Mother     Uterine Cancer Neg Hx         Social History     Socioeconomic History    Marital status: Single     Spouse name: None    Number of children: None    Years of education: None    Highest education level: None   Tobacco Use    Smoking status: Never    Smokeless tobacco: Never   Substance and Sexual Activity    Alcohol use: No    Drug use: No         Patient has no known allergies. Previous Medications    LISINOPRIL-HYDROCHLOROTHIAZIDE (PRINZIDE;ZESTORETIC) 20-25 MG PER TABLET    Take 1 tablet by mouth daily    MELOXICAM (MOBIC) 7.5 MG TABLET    Take 7.5 mg by mouth daily as needed    ONDANSETRON (ZOFRAN-ODT) 4 MG DISINTEGRATING TABLET    Take 1 tablet by mouth 3 times daily as needed for Nausea or Vomiting    ROSUVASTATIN (CRESTOR) 5 MG TABLET    Take 5 mg by mouth in the morning. Vitals signs and nursing note reviewed.    Patient Vitals for the past 4 hrs:   Temp Pulse Resp BP SpO2   08/10/22 1435 -- -- 18 118/66 99 %   08/10/22 1355 -- 97 18 117/60 99 %   08/10/22 1329 -- 90 16 (!) 125/59 98 %   08/10/22 1255 -- 88 16 120/61 99 %   08/10/22 1237 -- 98 -- -- --   08/10/22 1225 -- -- 18 121/66 100 %   08/10/22 1207 97.6 °F (36.4 °C) (!) 106 18 107/66 100 %          Physical Exam  Vitals and nursing note reviewed. Constitutional:       General: She is not in acute distress. Appearance: Normal appearance. She is well-developed. She is obese. She is not ill-appearing, toxic-appearing or diaphoretic. Comments: Patient appears uncomfortable   HENT:      Head: Normocephalic and atraumatic. Nose: Nose normal.      Mouth/Throat:      Mouth: Mucous membranes are moist.      Pharynx: Oropharynx is clear. No oropharyngeal exudate or posterior oropharyngeal erythema. Eyes:      General: No scleral icterus. Extraocular Movements: Extraocular movements intact. Conjunctiva/sclera: Conjunctivae normal.   Cardiovascular:      Rate and Rhythm: Normal rate. Pulses: Normal pulses. Heart sounds: Normal heart sounds. Pulmonary:      Effort: Pulmonary effort is normal. No respiratory distress. Breath sounds: Normal breath sounds. No stridor. No wheezing, rhonchi or rales. Chest:      Chest wall: No tenderness. Abdominal:      General: Bowel sounds are normal. There is no distension. Palpations: Abdomen is soft. Tenderness: There is generalized abdominal tenderness. There is guarding. There is no right CVA tenderness, left CVA tenderness or rebound. Hernia: No hernia is present. Musculoskeletal:         General: Normal range of motion. Cervical back: Normal range of motion and neck supple. No rigidity or tenderness. Right lower leg: No edema. Left lower leg: No edema. Lymphadenopathy:      Cervical: No cervical adenopathy. Skin:     General: Skin is warm and dry. Capillary Refill: Capillary refill takes less than 2 seconds. Coloration: Skin is not jaundiced or pale. Findings: No bruising, erythema, lesion or rash. Neurological:      General: No focal deficit present. Mental Status: She is alert and oriented to person, place, and time. Psychiatric:         Mood and Affect: Mood normal.         Behavior: Behavior normal.         Thought Content: Thought content normal.         Judgment: Judgment normal.        Procedures      Labs Reviewed   CBC WITH AUTO DIFFERENTIAL - Abnormal; Notable for the following components:       Result Value    WBC 23.8 (*)     RBC 5.38 (*)     Platelets 262 (*)     MPV 9.2 (*)     Seg Neutrophils 80 (*)     Lymphocytes 4 (*)     Monocytes 14 (*)     Eosinophils % 0 (*)     Segs Absolute 19.0 (*)     Absolute Mono # 3.3 (*)     All other components within normal limits   COMPREHENSIVE METABOLIC PANEL - Abnormal; Notable for the following components:    Sodium 132 (*)     Chloride 95 (*)     CO2 33 (*)     Anion Gap 4 (*)     Glucose 126 (*)     BUN 32 (*)     Creatinine 1.11 (*)     GFR Non- 52 (*)     Albumin 2.8 (*)     Globulin 5.1 (*)     Albumin/Globulin Ratio 0.5 (*)     All other components within normal limits   LIPASE - Abnormal; Notable for the following components:    Lipase 31 (*)     All other components within normal limits   CULTURE, STOOL   CULTURE, BLOOD 1   CULTURE, BLOOD 1   C DIFF TOXIN/ANTIGEN   LACTIC ACID        CT ABDOMEN PELVIS W IV CONTRAST Additional Contrast? None   Final Result   1. Diffuse colonic wall thickening suggests an infectious/inflammatory colitis,   including C. difficile. 2. Small amount of ascites. 3. Blind-ending fluid-filled tubular structure could represent a dilated   appendix without surrounding inflammatory change. There could conceivably be   obstruction of the appendiceal orifice due to the extensive mucosal thickening. Secondary acute appendicitis is not excluded   4. Prominent mesenteric lymph nodes within the lower abdomen, most likely   reactive.       XR CHEST PORTABLE   Final Result   No acute cardiopulmonary abnormality. ED Course as of 08/10/22 1554   Wed Aug 10, 2022   1326 Urine dip negative for nitrites, leuks, blood, glucose [JG]   1336 Lipase(!):    Lipase 31(!) [JG]   1337 CMP(!):    Sodium 132(!)   Potassium 3.6   Chloride 95(!)   CO2 33(!)   Anion Gap 4(!)   GLUCOSE - FASTING 126(!)   BUN,BUNPL 32(!)   Creatinine 1.11(!)   GFR  >60   GFR Non- 52(!)   CALCIUM, SERUM, 909178 9.9   Bilirubin 0.6   ALT 16   AST 16   Alk Phosphatase 100   Total Protein 7.9   Albumin 2.8(!)   Globulin 5.1(!)   ALBUMIN/GLOBULIN RATIO 0.5(!) [JG]   1337 CBC with Diff(!):    WBC 23.8(!)   RBC 5.38(!)   Hemoglobin Quant 14.2   Hematocrit 44.0   MCV 81.8   MCH 26.4   MCHC 32.3   RDW 14.0   Platelet Count 027(!)   MPV 9.2(!)   Nucleated Red Blood Cells 0.00   Differential Type AUTOMATED   Seg Neutrophils 80(!)   Lymphocytes 4(!)   Monocytes 14(!)   Eosinophils % 0(!)   Basophils 1   Immature Granulocytes 1   Segs Absolute 19.0(!)   Absolute Lymph # 1.0   Absolute Mono # 3.3(!)   Absolute Eos # 0.0   Basophils Absolute 0.2   Absolute Immature Granulocyte 0.3 [JG]   1413 Lactic Acid (Select if patient is over 65 to rule out mesenteric ischemia):    Lactic Acid, Plasma 1.6 [JG]   1414 XR CHEST PORTABLE  FINDINGS:  The cardiomediastinal silhouette is within normal limits. No focal parenchymal  process. No pleural effusion. No pneumothorax. No acute osseous abnormality. IMPRESSION:  No acute cardiopulmonary abnormality. [JG]   1509 CT ABDOMEN PELVIS W IV CONTRAST Additional Contrast? None  CT ABDOMEN: There is no hepatic or splenic lesion. The pancreas and adrenal  glands unremarkable. The kidneys enhance symmetrically without discrete  abnormality. There is a small amount of ascites within the right abdomen. There is diffuse  relatively extensive colonic wall thickening, particularly of the ascending  colon. There is no small bowel dilatation.  There are few mesenteric lymph nodes  one of which measures up to 17 mm in short axis within the lower mid abdomen. There is a fluid filled tubular structure extending superiorly from the cecum. This could represent a dilated appendix measuring up to 16 mm in diameter  although there are no surrounding inflammatory changes. CT PELVIS: Continued wall thickening of the colon is present to the level of the  rectum. The pelvic structures are unremarkable. No aggressive osseous lesion is  present. IMPRESSION:  1. Diffuse colonic wall thickening suggests an infectious/inflammatory colitis,  including C. difficile. 2. Small amount of ascites. 3. Blind-ending fluid-filled tubular structure could represent a dilated  appendix without surrounding inflammatory change. There could conceivably be  obstruction of the appendiceal orifice due to the extensive mucosal thickening. Secondary acute appendicitis is not excluded  4. Prominent mesenteric lymph nodes within the lower abdomen, most likely  reactive. [JG]   1512 Contacted hospitalist regarding admission. Will consult  general surgery if hospitalist is not going to do the consult. [JG]      ED Course User Index  [JG] NADIYA Sparks        Voice dictation software was used during the making of this note. This software is not perfect and grammatical and other typographical errors may be present. This note has not been completely proofread for errors.       James Sparks  08/10/22 [id number]

## 2022-08-10 NOTE — ED NOTES
TRANSFER - OUT REPORT:    Verbal report given to Leon Victoria RN on Sunshine Buitrago  being transferred to [facility] for routine progression of patient care       Report consisted of patient's Situation, Background, Assessment and   Recommendations(SBAR). Information from the following report(s) Nurse Handoff Report, ED SBAR, [facility], and Recent Results was reviewed with the receiving nurse. Lines:   Peripheral IV 08/10/22 Right Antecubital (Active)   Site Assessment Clean, dry & intact 08/10/22 1243   Line Status Normal saline locked; Blood return noted;Specimen collected; Flushed 08/10/22 1243   Phlebitis Assessment No symptoms 08/10/22 1243   Infiltration Assessment 0 08/10/22 1243        Opportunity for questions and clarification was provided.       Patient transported with:  Registered Nurse      Kt Leiva RN  08/10/22 1800

## 2022-08-10 NOTE — H&P
Hospitalist History and Physical   Admit Date:  8/10/2022 12:11 PM   Name:  Maldonado Cano   Age:  77 y.o. Sex:  female  :  1955   MRN:  [id number]   Room:  [facility]/    Presenting Complaint: Abdominal Pain     Reason(s) for Admission: Colitis [K52.9]  MILLA (acute kidney injury) ([facility] [N17.9]  Leukocytosis, unspecified type [D72.829]  Acute appendicitis with generalized peritonitis, unspecified whether abscess present, unspecified whether gangrene present, unspecified whether perforation present [K35.20]  Sepsis without septic shock ([facility] [A41.9]     History of Present Illness: Maldonado Cano is a 77 y.o. female with medical history of hypertension, hyperlipidemia, diabetes who presented with intractable abdominal pain. She was previously seen in the emergency department 2 weeks prior with nausea and vomiting. She was started on a course of antibiotics without resolution of symptoms. Her nausea has gotten much worse she has painful abdomen with diarrhea. She goes to the bathroom approximately 4-6 times per days with loose watery stools. Review of Systems:  10 systems reviewed and negative except as noted in HPI.   Assessment & Plan:   Sepsis without septic shock due to infectious diarrhea  Admission WBC 23, , source infectious diarrhea  -Metronidazole, cefepime  -Diarrhea labs  -Follow cultures    Acute kidney injury  Admission sCr 1.1 from baseline ~0.8  -Avoid nephrotoxic substances  -Resuscitate LR @ 125    Fluid or electrolyte disorder  8/10/2022: Hyponatremia, resuscitate, recheck    Hypertension  -Hold lisinopril, hydrochlorothiazide for MILLA      Diabetes mellitus  Admission serum glucose 126  -Start insulin if needed    Hyperlipidemia  -Rosuvastatin      Dispo/Discharge Planning:   Pending clinical improvement    Diet: Diet NPO  VTE ppx: Enoxaparin  Code status: Full Code    Hospital Problems:  Principal Problem:    Sepsis without septic shock ([facility]  Active Problems:    Disorder of fluid or electrolyte    Hyponatremia    Hypertension    Diabetes mellitus ([facility]    Hyperlipidemia  Resolved Problems:    * No resolved hospital problems. *       Past History:     Past Medical History:   Diagnosis Date    Diabetes mellitus ([facility] 2014    not on medications hGB a1C 6.5    Endometriosis     total hysterectomy w/o BSO    Hyperlipidemia     Hypertension     not taking medication    Menopause        Past Surgical History:   Procedure Laterality Date    BREAST BIOPSY      BREAST SURGERY      benign lump    HYSTERECTOMY (CERVIX STATUS UNKNOWN)  1984    Total for endometriosis    HYSTERECTOMY, TOTAL ABDOMINAL (CERVIX REMOVED)  1983    ovaries intact        Social History     Tobacco Use    Smoking status: Never    Smokeless tobacco: Never   Substance Use Topics    Alcohol use: No      Social History     Substance and Sexual Activity   Drug Use No       Family History   Problem Relation Age of Onset    Colon Cancer Neg Hx     Ovarian Cancer Neg Hx     Breast Cancer Neg Hx     No Known Problems Brother     No Known Problems Brother     No Known Problems Sister     No Known Problems Father     Osteoarthritis Mother     Uterine Cancer Neg Hx           There is no immunization history on file for this patient.   No Known Allergies  Prior to Admit Medications:  Current Outpatient Medications   Medication Instructions    lisinopril-hydroCHLOROthiazide (PRINZIDE;ZESTORETIC) 20-25 MG per tablet 1 tablet, Oral, DAILY    meloxicam (MOBIC) 7.5 mg, Oral, DAILY PRN    ondansetron (ZOFRAN-ODT) 4 mg, Oral, 3 TIMES DAILY PRN    rosuvastatin (CRESTOR) 5 mg, Oral, DAILY         Objective:   Patient Vitals for the past 24 hrs:   Temp Pulse Resp BP SpO2   08/10/22 1729 -- 99 18 128/60 97 %   08/10/22 1659 -- 98 16 131/63 97 %   08/10/22 1629 -- (!) 101 18 (!) 123/59 97 %   08/10/22 1600 -- (!) 103 16 (!) 123/58 98 %   08/10/22 1459 -- 98 18 129/67 96 %   08/10/22 1435 -- -- 18 118/66 99 %   08/10/22 1355 -- 97 18 117/60 99 % 08/10/22 1329 -- 90 16 (!) 125/59 98 %   08/10/22 1255 -- 88 16 120/61 99 %   08/10/22 1237 -- 98 -- -- --   08/10/22 1225 -- -- 18 121/66 100 %   08/10/22 1207 97.6 °F (36.4 °C) (!) 106 18 107/66 100 %       Oxygen Therapy  SpO2: 97 %    Estimated body mass index is 27.87 kg/m² as calculated from the following:    Height as of this encounter: 4' 11\" (1.499 m). Weight as of this encounter: 138 lb (62.6 kg). Intake/Output Summary (Last 24 hours) at 8/10/2022 1822  Last data filed at 8/10/2022 1610  Gross per 24 hour   Intake 1050 ml   Output --   Net 1050 ml         Blood pressure 128/60, pulse 99, temperature 97.6 °F (36.4 °C), temperature source Oral, resp. rate 18, height 4' 11\" (1.499 m), weight 138 lb (62.6 kg), SpO2 97 %. Physical Exam  Vitals and nursing note reviewed. Constitutional:       General: She is in acute distress (moderate). Appearance: She is ill-appearing. She is not diaphoretic. Eyes:      Extraocular Movements: Extraocular movements intact. Cardiovascular:      Rate and Rhythm: Normal rate and regular rhythm. Pulmonary:      Effort: Pulmonary effort is normal. No respiratory distress. Abdominal:      General: Bowel sounds are normal. There is no distension. Palpations: Abdomen is soft. Tenderness: There is abdominal tenderness. There is guarding. Musculoskeletal:         General: No deformity. Skin:     Coloration: Skin is not jaundiced or pale. Neurological:      General: No focal deficit present. Mental Status: She is alert and oriented to person, place, and time. Psychiatric:         Mood and Affect: Mood is depressed. Behavior: Behavior normal. Behavior is cooperative.          I have personally reviewed labs and tests showing:  Recent Labs:  Recent Results (from the past 24 hour(s))   CBC with Diff    Collection Time: 08/10/22 12:43 PM   Result Value Ref Range    WBC 23.8 (H) 4.3 - 11.1 K/uL    RBC 5.38 (H) 4.05 - 5.2 M/uL    Hemoglobin 14.2 11.7 - 15.4 g/dL    Hematocrit 44.0 35.8 - 46.3 %    MCV 81.8 79.6 - 97.8 FL    MCH 26.4 26.1 - 32.9 PG    MCHC 32.3 31.4 - 35.0 g/dL    RDW 14.0 11.9 - 14.6 %    Platelets 958 (H) 753 - 450 K/uL    MPV 9.2 (L) 9.4 - 12.3 FL    nRBC 0.00 0.0 - 0.2 K/uL    Differential Type AUTOMATED      Seg Neutrophils 80 (H) 43 - 78 %    Lymphocytes 4 (L) 13 - 44 %    Monocytes 14 (H) 4.0 - 12.0 %    Eosinophils % 0 (L) 0.5 - 7.8 %    Basophils 1 0.0 - 2.0 %    Immature Granulocytes 1 0.0 - 5.0 %    Segs Absolute 19.0 (H) 1.7 - 8.2 K/UL    Absolute Lymph # 1.0 0.5 - 4.6 K/UL    Absolute Mono # 3.3 (H) 0.1 - 1.3 K/UL    Absolute Eos # 0.0 0.0 - 0.8 K/UL    Basophils Absolute 0.2 0.0 - 0.2 K/UL    Absolute Immature Granulocyte 0.3 0.0 - 0.5 K/UL   CMP    Collection Time: 08/10/22 12:43 PM   Result Value Ref Range    Sodium 132 (L) 136 - 145 mmol/L    Potassium 3.6 3.5 - 5.1 mmol/L    Chloride 95 (L) 98 - 107 mmol/L    CO2 33 (H) 21 - 32 mmol/L    Anion Gap 4 (L) 7 - 16 mmol/L    Glucose 126 (H) 65 - 100 mg/dL    BUN 32 (H) 8 - 23 MG/DL    Creatinine 1.11 (H) 0.6 - 1.0 MG/DL    GFR African American >60 >60 ml/min/1.73m2    GFR Non- 52 (L) >60 ml/min/1.73m2    Calcium 9.9 8.3 - 10.4 MG/DL    Total Bilirubin 0.6 0.2 - 1.1 MG/DL    ALT 16 12 - 65 U/L    AST 16 15 - 37 U/L    Alk Phosphatase 100 50 - 136 U/L    Total Protein 7.9 6.3 - 8.2 g/dL    Albumin 2.8 (L) 3.2 - 4.6 g/dL    Globulin 5.1 (H) 2.3 - 3.5 g/dL    Albumin/Globulin Ratio 0.5 (L) 1.2 - 3.5     Lipase    Collection Time: 08/10/22 12:43 PM   Result Value Ref Range    Lipase 31 (L) 73 - 393 U/L   Lactic Acid (Select if patient is over 65 to rule out mesenteric ischemia)    Collection Time: 08/10/22 12:43 PM   Result Value Ref Range    Lactic Acid, Plasma 1.6 0.4 - 2.0 MMOL/L       I have personally reviewed imaging studies showing:  CT ABDOMEN PELVIS W IV CONTRAST Additional Contrast? None    Result Date: 8/10/2022  CT ABDOMEN AND PELVIS WITH CONTRAST HISTORY:  Abdominal pain and vomiting x2 weeks. Infrequent bowel movements. TECHNIQUE: Helically acquired images were obtained from the domes of the diaphragms to the ischial tuberosities reconstructed at 5mm intervals after the uneventful administration of 100c's of intravenous Isovue-370 in order to better evaluate the solid abdominal viscera and vascular structures. Oral contrast was not administered per the emergency imaging BMI protocol. Coronal and sagittal reformatted images were submitted. Radiation dose reduction techniques were used for this study:  Our CT scanners use one or all of the following: Automated exposure control, adjustment of the mA and/or kVp according to patient's size, iterative reconstruction. COMPARISON: None. Correlation is made to the abdominal series 07/25/2022. CT ABDOMEN: There is no hepatic or splenic lesion. The pancreas and adrenal glands unremarkable. The kidneys enhance symmetrically without discrete abnormality. There is a small amount of ascites within the right abdomen. There is diffuse relatively extensive colonic wall thickening, particularly of the ascending colon. There is no small bowel dilatation. There are few mesenteric lymph nodes one of which measures up to 17 mm in short axis within the lower mid abdomen. There is a fluid filled tubular structure extending superiorly from the cecum. This could represent a dilated appendix measuring up to 16 mm in diameter although there are no surrounding inflammatory changes. CT PELVIS: Continued wall thickening of the colon is present to the level of the rectum. The pelvic structures are unremarkable. No aggressive osseous lesion is present. 1. Diffuse colonic wall thickening suggests an infectious/inflammatory colitis, including C. difficile. 2. Small amount of ascites. 3. Blind-ending fluid-filled tubular structure could represent a dilated appendix without surrounding inflammatory change.  There could conceivably be obstruction of the appendiceal orifice due to the extensive mucosal thickening. Secondary acute appendicitis is not excluded 4. Prominent mesenteric lymph nodes within the lower abdomen, most likely reactive. XR CHEST PORTABLE    Result Date: 8/10/2022  EXAM: XR CHEST PORTABLE INDICATION: sepsis COMPARISON: Chest radiograph, 1/16/2016 FINDINGS: The cardiomediastinal silhouette is within normal limits. No focal parenchymal process. No pleural effusion. No pneumothorax. No acute osseous abnormality. No acute cardiopulmonary abnormality. Echocardiogram:  04/28/22    TRANSTHORACIC ECHOCARDIOGRAM (TTE) COMPLETE (CONTRAST/BUBBLE/3D PRN) 04/28/2022  6:35 PM (Final)    Narrative  This is a summary report. The complete report is available in the patient's medical record. If you cannot access the medical record, please contact the sending organization for a detailed fax or copy. Left Ventricle: Normal left ventricular systolic function with a visually estimated EF of 55 - 60%. Left ventricle size is normal. Mildly increased wall thickness. Findings consistent with mild concentric hypertrophy. Normal wall motion. Normal diastolic function. Average E/e' ratio is 8. 31. Mitral Valve: Mild regurgitation with a centrally directed jet. Tricuspid Valve: Mild to moderate regurgitation. The estimated RVSP is 36 mmHgmmHg. Left Atrium: Left atrium is mildly dilated. LA Vol Index is  34 ml/m2. Signed by:  Xiao Cee MD on 4/28/2022  6:35 PM      Meds previously ordered:  Orders Placed This Encounter   Medications    0.9 % sodium chloride bolus    HYDROmorphone HCl PF (DILAUDID) injection 0.25 mg    ondansetron (ZOFRAN) injection 4 mg    cefepime (MAXIPIME) 2,000 mg in sodium chloride 0.9 % 50 mL IVPB mini-bag     Order Specific Question:   Antimicrobial Indications     Answer:   Intra-Abdominal Infection    metronidazole (FLAGYL) 500 mg in 0.9% NaCl 100 mL IVPB premix     Order Specific Question: Antimicrobial Indications     Answer:   Intra-Abdominal Infection    0.9 % sodium chloride bolus    rosuvastatin (CRESTOR) tablet 5 mg    sodium chloride flush 0.9 % injection 5-40 mL    sodium chloride flush 0.9 % injection 5-40 mL    0.9 % sodium chloride infusion    enoxaparin (LOVENOX) injection 40 mg     Order Specific Question:   Indication of Use     Answer:   Prophylaxis-DVT/PE    OR Linked Order Group     ondansetron (ZOFRAN-ODT) disintegrating tablet 4 mg     ondansetron (ZOFRAN) injection 4 mg    polyethylene glycol (GLYCOLAX) packet 17 g    OR Linked Order Group     acetaminophen (TYLENOL) tablet 650 mg     acetaminophen (TYLENOL) suppository 650 mg    OR Linked Order Group     potassium chloride (KLOR-CON M) extended release tablet 40 mEq     potassium bicarb-citric acid (EFFER-K) effervescent tablet 40 mEq     potassium chloride 10 mEq/100 mL IVPB (Peripheral Line)    magnesium sulfate 2000 mg in 50 mL IVPB premix    OR Linked Order Group     sodium phosphate 10 mmol in sodium chloride 0.9 % 250 mL IVPB     sodium phosphate 15 mmol in sodium chloride 0.9 % 250 mL IVPB     sodium phosphate 20 mmol in sodium chloride 0.9 % 500 mL IVPB    lactated ringers infusion    cefepime (MAXIPIME) 2,000 mg in sodium chloride 0.9 % 50 mL IVPB mini-bag     Order Specific Question:   Antimicrobial Indications     Answer:   Intra-Abdominal Infection         Signed:  Jasmin Moss MD

## 2022-08-10 NOTE — PROGRESS NOTES
Medical Student Progress Note   Admit Date:  8/10/2022 12:11 PM   Name:  Leon Brenner   Age:  77 y.o. Sex:  female  :  1955   MRN:  [id number]     Presenting Complaint: Abdominal Pain    Reason(s) for Admission: Sepsis without septic shock [facility]) [A41.9]       Hospital Course & Interval History:       Subjective (08/10/22): Alicia Cardona is a 77 y.o. female with a history of hypertension, hyperlipidemia, sepsis, and diabetes. She presented to the ED 2 weeks ago for N/V and given levsin and zofran. She presents today to the ED today c/o no improvement to symptoms, generalized abd pain, 21lb weight loss, diarrhea, vomiting, and fatigue. Assessment & Plan:   Colitis  CT ABD/Pelvis w/IV Contrast suggested infectious/inflammatory colitis including C/Difficile and a prominent mesenteric lymph node in lower abdomen.  -Stool culture including C. Difficile toxin/antigen  -Monitor CBC, CMP, Lactic acid  -IV Vancomycin/Metronidazole prophylaxis  -IV NS continuous drip    Acute appendicitis with generalized peritonitis  CT ABD/Pelvis showed blind-ending fluid filled structure possibly the appendix w/o inflammatory change.  -Acetaminophen or oxycodone for pain prn based on severity. Hypertension  -Continue Lisinopril/HCTZ 20-25mg    Hyperlipidemia  -Continue Rosuvastatin 5mg      Diet:  Diet NPO  DVT PPx: Enoxaparin  Code status:    Physical Exam  Constitutional:       General: She is in acute distress. Appearance: She is ill-appearing. HENT:      Head: Normocephalic and atraumatic. Nose: Nose normal.      Mouth/Throat:      Mouth: Mucous membranes are moist.   Eyes:      Extraocular Movements: Extraocular movements intact. Conjunctiva/sclera: Conjunctivae normal.      Pupils: Pupils are equal, round, and reactive to light. Cardiovascular:      Rate and Rhythm: Normal rate and regular rhythm. Pulses: Normal pulses. Heart sounds: Normal heart sounds.    Pulmonary:      Effort: Pulmonary effort is normal.      Breath sounds: Normal breath sounds. Abdominal:      General: Bowel sounds are normal.      Tenderness: There is abdominal tenderness. There is guarding. Musculoskeletal:      Cervical back: Normal range of motion and neck supple. Skin:     General: Skin is warm. Neurological:      Mental Status: She is oriented to person, place, and time. Active Hospital Problems    Diagnosis Date Noted    Sepsis without septic shock ([facility] 08/10/2022     Priority: Medium    Hypertension      not taking medication        Diabetes mellitus ([facility]     Hyperlipidemia      Objective:   Patient Vitals for the past 24 hrs:   Temp Pulse Resp BP SpO2   08/10/22 1435 -- -- 18 118/66 99 %   08/10/22 1355 -- 97 18 117/60 99 %   08/10/22 1329 -- 90 16 (!) 125/59 98 %   08/10/22 1255 -- 88 16 120/61 99 %   08/10/22 1237 -- 98 -- -- --   08/10/22 1225 -- -- 18 121/66 100 %   08/10/22 1207 97.6 °F (36.4 °C) (!) 106 18 107/66 100 %     @BSHSIFLOW(2444:last)@    Estimated body mass index is 27.87 kg/m² as calculated from the following:    Height as of this encounter: 4' 11\" (1.499 m). Weight as of this encounter: 138 lb (62.6 kg).     Intake/Output Summary (Last 24 hours) at 8/10/2022 1631  Last data filed at 8/10/2022 1410  Gross per 24 hour   Intake 1000 ml   Output --   Net 1000 ml     I have reviewed ordered lab tests and independently visualized imaging below:    Last 24hr Labs:  Recent Results (from the past 24 hour(s))   CBC with Diff    Collection Time: 08/10/22 12:43 PM   Result Value Ref Range    WBC 23.8 (H) 4.3 - 11.1 K/uL    RBC 5.38 (H) 4.05 - 5.2 M/uL    Hemoglobin 14.2 11.7 - 15.4 g/dL    Hematocrit 44.0 35.8 - 46.3 %    MCV 81.8 79.6 - 97.8 FL    MCH 26.4 26.1 - 32.9 PG    MCHC 32.3 31.4 - 35.0 g/dL    RDW 14.0 11.9 - 14.6 %    Platelets 905 (H) 728 - 450 K/uL    MPV 9.2 (L) 9.4 - 12.3 FL    nRBC 0.00 0.0 - 0.2 K/uL    Differential Type AUTOMATED      Seg Neutrophils 80 (H) 43 - 78 % Lymphocytes 4 (L) 13 - 44 %    Monocytes 14 (H) 4.0 - 12.0 %    Eosinophils % 0 (L) 0.5 - 7.8 %    Basophils 1 0.0 - 2.0 %    Immature Granulocytes 1 0.0 - 5.0 %    Segs Absolute 19.0 (H) 1.7 - 8.2 K/UL    Absolute Lymph # 1.0 0.5 - 4.6 K/UL    Absolute Mono # 3.3 (H) 0.1 - 1.3 K/UL    Absolute Eos # 0.0 0.0 - 0.8 K/UL    Basophils Absolute 0.2 0.0 - 0.2 K/UL    Absolute Immature Granulocyte 0.3 0.0 - 0.5 K/UL   CMP    Collection Time: 08/10/22 12:43 PM   Result Value Ref Range    Sodium 132 (L) 136 - 145 mmol/L    Potassium 3.6 3.5 - 5.1 mmol/L    Chloride 95 (L) 98 - 107 mmol/L    CO2 33 (H) 21 - 32 mmol/L    Anion Gap 4 (L) 7 - 16 mmol/L    Glucose 126 (H) 65 - 100 mg/dL    BUN 32 (H) 8 - 23 MG/DL    Creatinine 1.11 (H) 0.6 - 1.0 MG/DL    GFR African American >60 >60 ml/min/1.73m2    GFR Non- 52 (L) >60 ml/min/1.73m2    Calcium 9.9 8.3 - 10.4 MG/DL    Total Bilirubin 0.6 0.2 - 1.1 MG/DL    ALT 16 12 - 65 U/L    AST 16 15 - 37 U/L    Alk Phosphatase 100 50 - 136 U/L    Total Protein 7.9 6.3 - 8.2 g/dL    Albumin 2.8 (L) 3.2 - 4.6 g/dL    Globulin 5.1 (H) 2.3 - 3.5 g/dL    Albumin/Globulin Ratio 0.5 (L) 1.2 - 3.5     Lipase    Collection Time: 08/10/22 12:43 PM   Result Value Ref Range    Lipase 31 (L) 73 - 393 U/L   Lactic Acid (Select if patient is over 65 to rule out mesenteric ischemia)    Collection Time: 08/10/22 12:43 PM   Result Value Ref Range    Lactic Acid, Plasma 1.6 0.4 - 2.0 MMOL/L       [unfilled]    Other Studies:  [unfilled]    Current Meds:  Current Facility-Administered Medications   Medication Dose Route Frequency    metronidazole (FLAGYL) 500 mg in 0.9% NaCl 100 mL IVPB premix  500 mg IntraVENous NOW    0.9 % sodium chloride bolus  1,000 mL IntraVENous Once    potassium chloride (KLOR-CON M) extended release tablet 40 mEq  40 mEq Oral PRN    Or    potassium bicarb-citric acid (EFFER-K) effervescent tablet 40 mEq  40 mEq Oral PRN    Or    potassium chloride 10 mEq/100 mL IVPB (Peripheral Line)  10 mEq IntraVENous PRN    magnesium sulfate 2000 mg in 50 mL IVPB premix  2,000 mg IntraVENous PRN    sodium phosphate 10 mmol in sodium chloride 0.9 % 250 mL IVPB  10 mmol IntraVENous PRN    Or    sodium phosphate 15 mmol in sodium chloride 0.9 % 250 mL IVPB  15 mmol IntraVENous PRN    Or    sodium phosphate 20 mmol in sodium chloride 0.9 % 500 mL IVPB  20 mmol IntraVENous PRN     Current Outpatient Medications   Medication Sig    rosuvastatin (CRESTOR) 5 MG tablet Take 5 mg by mouth in the morning. ondansetron (ZOFRAN-ODT) 4 MG disintegrating tablet Take 1 tablet by mouth 3 times daily as needed for Nausea or Vomiting    lisinopril-hydroCHLOROthiazide (PRINZIDE;ZESTORETIC) 20-25 MG per tablet Take 1 tablet by mouth daily    meloxicam (MOBIC) 7.5 MG tablet Take 7.5 mg by mouth daily as needed     Facility-Administered Medications Ordered in Other Encounters   Medication Dose Route Frequency    sodium chloride flush 0.9 % injection 10 mL  10 mL IntraVENous ONCE PRN       Signed:  Jacob Yanes    Part of this note may have been written by using a voice dictation software. The note has been proof read but may still contain some grammatical/other typographical errors.

## 2022-08-10 NOTE — ED TRIAGE NOTES
Pt states that she was seen about 2 weeks ago for abdominal pain and vomiting. Pt states she was sent home with levsin and zofran but these have not helped. Pt also has not had a significant bowel movement but has had infrequent small amounts of loose stool.

## 2022-08-11 PROBLEM — A09 INFECTIOUS DIARRHEA: Status: ACTIVE | Noted: 2022-08-11

## 2022-08-11 PROBLEM — E77.8 HYPOPROTEINEMIA (HCC): Status: ACTIVE | Noted: 2022-08-11

## 2022-08-11 PROBLEM — R93.5 ABNORMAL CT OF THE ABDOMEN: Status: ACTIVE | Noted: 2022-08-11

## 2022-08-11 PROBLEM — A04.72 C. DIFFICILE COLITIS: Status: ACTIVE | Noted: 2022-08-11

## 2022-08-11 PROBLEM — R10.84 GENERALIZED ABDOMINAL PAIN: Status: ACTIVE | Noted: 2022-08-11

## 2022-08-11 PROBLEM — K52.9 COLITIS: Status: ACTIVE | Noted: 2022-08-11

## 2022-08-11 PROBLEM — D72.829 LEUKOCYTOSIS: Status: ACTIVE | Noted: 2022-08-11

## 2022-08-11 LAB
ALBUMIN SERPL-MCNC: 2 G/DL (ref 3.2–4.6)
ALBUMIN/GLOB SERPL: 0.5 {RATIO} (ref 1.2–3.5)
ALP SERPL-CCNC: 70 U/L (ref 50–130)
ALT SERPL-CCNC: 12 U/L (ref 12–65)
ANION GAP SERPL CALC-SCNC: 7 MMOL/L (ref 7–16)
AST SERPL-CCNC: 10 U/L (ref 15–37)
BASOPHILS # BLD: 0.1 K/UL (ref 0–0.2)
BASOPHILS NFR BLD: 0 % (ref 0–2)
BILIRUB SERPL-MCNC: 0.5 MG/DL (ref 0.2–1.1)
BUN SERPL-MCNC: 17 MG/DL (ref 8–23)
C DIFF GDH STL QL: ABNORMAL
C DIFF TOX A+B STL QL IA: ABNORMAL
C DIFF TOXIN INTERPRETATION: ABNORMAL
CALCIUM SERPL-MCNC: 9.1 MG/DL (ref 8.3–10.4)
CHLORIDE SERPL-SCNC: 103 MMOL/L (ref 98–107)
CLINICAL CONSIDERATION: ABNORMAL
CO2 SERPL-SCNC: 28 MMOL/L (ref 21–32)
CREAT SERPL-MCNC: 0.65 MG/DL (ref 0.6–1)
DIFFERENTIAL METHOD BLD: ABNORMAL
EOSINOPHIL # BLD: 0 K/UL (ref 0–0.8)
EOSINOPHIL NFR BLD: 0 % (ref 0.5–7.8)
ERYTHROCYTE [DISTWIDTH] IN BLOOD BY AUTOMATED COUNT: 14.1 % (ref 11.9–14.6)
GLOBULIN SER CALC-MCNC: 4.1 G/DL (ref 2.3–3.5)
GLUCOSE SERPL-MCNC: 100 MG/DL (ref 65–100)
HCT VFR BLD AUTO: 39.3 % (ref 35.8–46.3)
HGB BLD-MCNC: 12.7 G/DL (ref 11.7–15.4)
IMM GRANULOCYTES # BLD AUTO: 0.2 K/UL (ref 0–0.5)
IMM GRANULOCYTES NFR BLD AUTO: 1 % (ref 0–5)
LYMPHOCYTES # BLD: 0.9 K/UL (ref 0.5–4.6)
LYMPHOCYTES NFR BLD: 4 % (ref 13–44)
MCH RBC QN AUTO: 26.6 PG (ref 26.1–32.9)
MCHC RBC AUTO-ENTMCNC: 32.3 G/DL (ref 31.4–35)
MCV RBC AUTO: 82.2 FL (ref 79.6–97.8)
MONOCYTES # BLD: 3.6 K/UL (ref 0.1–1.3)
MONOCYTES NFR BLD: 16 % (ref 4–12)
NEUTS SEG # BLD: 17.9 K/UL (ref 1.7–8.2)
NEUTS SEG NFR BLD: 79 % (ref 43–78)
NRBC # BLD: 0 K/UL (ref 0–0.2)
PHOSPHATE SERPL-MCNC: 2 MG/DL (ref 2.3–3.7)
PLATELET # BLD AUTO: 447 K/UL (ref 150–450)
PMV BLD AUTO: 9.1 FL (ref 9.4–12.3)
POTASSIUM SERPL-SCNC: 3.8 MMOL/L (ref 3.5–5.1)
PROT SERPL-MCNC: 6.1 G/DL (ref 6.3–8.2)
RBC # BLD AUTO: 4.78 M/UL (ref 4.05–5.2)
REFLEX: ABNORMAL
SODIUM SERPL-SCNC: 138 MMOL/L (ref 136–145)
WBC # BLD AUTO: 22.8 K/UL (ref 4.3–11.1)

## 2022-08-11 PROCEDURE — 2580000003 HC RX 258: Performed by: SURGERY

## 2022-08-11 PROCEDURE — 1100000000 HC RM PRIVATE

## 2022-08-11 PROCEDURE — 2500000003 HC RX 250 WO HCPCS: Performed by: SURGERY

## 2022-08-11 PROCEDURE — 87324 CLOSTRIDIUM AG IA: CPT

## 2022-08-11 PROCEDURE — 80053 COMPREHEN METABOLIC PANEL: CPT

## 2022-08-11 PROCEDURE — 36415 COLL VENOUS BLD VENIPUNCTURE: CPT

## 2022-08-11 PROCEDURE — 97530 THERAPEUTIC ACTIVITIES: CPT

## 2022-08-11 PROCEDURE — 84100 ASSAY OF PHOSPHORUS: CPT

## 2022-08-11 PROCEDURE — 97161 PT EVAL LOW COMPLEX 20 MIN: CPT

## 2022-08-11 PROCEDURE — 87046 STOOL CULTR AEROBIC BACT EA: CPT

## 2022-08-11 PROCEDURE — 6370000000 HC RX 637 (ALT 250 FOR IP): Performed by: FAMILY MEDICINE

## 2022-08-11 PROCEDURE — 97535 SELF CARE MNGMENT TRAINING: CPT

## 2022-08-11 PROCEDURE — 2580000003 HC RX 258: Performed by: FAMILY MEDICINE

## 2022-08-11 PROCEDURE — 6360000002 HC RX W HCPCS: Performed by: SURGERY

## 2022-08-11 PROCEDURE — 97165 OT EVAL LOW COMPLEX 30 MIN: CPT

## 2022-08-11 PROCEDURE — 85025 COMPLETE CBC W/AUTO DIFF WBC: CPT

## 2022-08-11 PROCEDURE — 99223 1ST HOSP IP/OBS HIGH 75: CPT | Performed by: SURGERY

## 2022-08-11 RX ORDER — METRONIDAZOLE 500 MG/100ML
500 INJECTION, SOLUTION INTRAVENOUS EVERY 6 HOURS
Status: DISCONTINUED | OUTPATIENT
Start: 2022-08-11 | End: 2022-08-14 | Stop reason: HOSPADM

## 2022-08-11 RX ORDER — SACCHAROMYCES BOULARDII 250 MG
250 CAPSULE ORAL 2 TIMES DAILY
Status: DISCONTINUED | OUTPATIENT
Start: 2022-08-11 | End: 2022-08-14 | Stop reason: HOSPADM

## 2022-08-11 RX ADMIN — SODIUM CHLORIDE, SODIUM LACTATE, POTASSIUM CHLORIDE, AND CALCIUM CHLORIDE 150 ML/HR: 600; 310; 30; 20 INJECTION, SOLUTION INTRAVENOUS at 23:20

## 2022-08-11 RX ADMIN — ROSUVASTATIN CALCIUM 5 MG: 5 TABLET, COATED ORAL at 10:42

## 2022-08-11 RX ADMIN — METRONIDAZOLE 500 MG: 500 INJECTION, SOLUTION INTRAVENOUS at 17:24

## 2022-08-11 RX ADMIN — VANCOMYCIN HYDROCHLORIDE 500 MG: 5 INJECTION, POWDER, LYOPHILIZED, FOR SOLUTION INTRAVENOUS at 23:22

## 2022-08-11 RX ADMIN — Medication 250 MG: at 22:20

## 2022-08-11 RX ADMIN — Medication 250 MG: at 10:42

## 2022-08-11 RX ADMIN — VANCOMYCIN HYDROCHLORIDE 500 MG: 5 INJECTION, POWDER, LYOPHILIZED, FOR SOLUTION INTRAVENOUS at 11:47

## 2022-08-11 RX ADMIN — METRONIDAZOLE 500 MG: 500 INJECTION, SOLUTION INTRAVENOUS at 10:48

## 2022-08-11 RX ADMIN — SODIUM CHLORIDE, SODIUM LACTATE, POTASSIUM CHLORIDE, AND CALCIUM CHLORIDE: 600; 310; 30; 20 INJECTION, SOLUTION INTRAVENOUS at 04:21

## 2022-08-11 RX ADMIN — METRONIDAZOLE 500 MG: 500 INJECTION, SOLUTION INTRAVENOUS at 23:14

## 2022-08-11 ASSESSMENT — PAIN SCALES - GENERAL: PAINLEVEL_OUTOF10: 0

## 2022-08-11 NOTE — PROGRESS NOTES
Hospitalist Progress Note   Admit Date:  8/10/2022 12:11 PM   Name:  Felicitas Morgan   Age:  77 y.o. Sex:  female  :  1955   MRN:  [id number]   Room:  [facility]/    Presenting Complaint: Abdominal Pain     Reason(s) for Admission: Colitis [K52.9]  MILLA (acute kidney injury) ([facility] [N17.9]  Leukocytosis, unspecified type [D72.829]  Acute appendicitis with generalized peritonitis, unspecified whether abscess present, unspecified whether gangrene present, unspecified whether perforation present [K35.20]  Sepsis without septic shock [facility]) [A41.9]     Hospital Course & Interval History:   77 y.o. female with medical history of hypertension, hyperlipidemia, diabetes who presented with intractable abdominal pain. She was previously seen in the emergency department 2 weeks prior with nausea and vomiting. She was started on a course of antibiotics without resolution of symptoms. Her nausea has gotten much worse she has painful abdomen with diarrhea. She goes to the bathroom approximately 4-6 times per days with loose watery stools. Subjective/24hr Events (22): Still feels weak, nauseous. Less uncomfortable than prior day. Lab results C diff +, adjusting meds. MILLA improved, perhaps resolved with resuscitation. Assessment & Plan:   Sepsis without septic shock due to infectious diarrhea (C diff)  Admission WBC 23, , source infectious diarrhea; cefepime (8/10)  -Metronidazole (8/10-. .), vancomycin po (-. .)  -Diarrhea labs  -Follow cultures  2022: lab and cultures with C diff, add vanc po, DC cefepime     Acute kidney injury  Admission sCr 1.1 from baseline ~0.8  -Avoid nephrotoxic substances  -Resuscitate LR @ 125  2022: sCr 0.65     Fluid or electrolyte disorder  2022: hypophosphatemia, replete, recheck     Hypertension  -Hold lisinopril, hydrochlorothiazide for MILLA  2022: BP stable off meds, continue to hold      Diabetes mellitus  Admission serum glucose 126  -Start insulin if needed  8/11/2022: glc 100, continue without insulin     Hyperlipidemia  -Rosuvastatin    Hypoproteinemia  -nutrition consult      Discharge Planning:      Home in 1-3d    Diet:  Diet NPO  DVT PPx: SCDs  Code status: Full Code    Hospital Problems:  Principal Problem:    Sepsis without septic shock ([facility]  Active Problems:    Disorder of fluid or electrolyte    Hyponatremia    Hypoproteinemia (HCC)    Hypertension    Diabetes mellitus ([facility]    Hyperlipidemia  Resolved Problems:    * No resolved hospital problems. *      Objective:   Patient Vitals for the past 24 hrs:   Temp Pulse Resp BP SpO2   08/11/22 0729 98.2 °F (36.8 °C) (!) 106 17 124/73 98 %   08/11/22 0339 97.9 °F (36.6 °C) 97 16 (!) 149/73 96 %   08/10/22 2324 97.9 °F (36.6 °C) 98 16 122/71 97 %   08/10/22 2013 98.4 °F (36.9 °C) 95 16 (!) 137/59 100 %   08/10/22 1828 98.2 °F (36.8 °C) (!) 108 -- 127/63 100 %   08/10/22 1729 -- 99 18 128/60 97 %   08/10/22 1659 -- 98 16 131/63 97 %   08/10/22 1629 -- (!) 101 18 (!) 123/59 97 %   08/10/22 1600 -- (!) 103 16 (!) 123/58 98 %   08/10/22 1459 -- 98 18 129/67 96 %   08/10/22 1435 -- -- 18 118/66 99 %   08/10/22 1355 -- 97 18 117/60 99 %   08/10/22 1329 -- 90 16 (!) 125/59 98 %   08/10/22 1255 -- 88 16 120/61 99 %   08/10/22 1237 -- 98 -- -- --   08/10/22 1225 -- -- 18 121/66 100 %   08/10/22 1207 97.6 °F (36.4 °C) (!) 106 18 107/66 100 %       Oxygen Therapy  SpO2: 98 %  O2 Device: None (Room air)    Estimated body mass index is 27.87 kg/m² as calculated from the following:    Height as of this encounter: 4' 11\" (1.499 m). Weight as of this encounter: 138 lb (62.6 kg). Intake/Output Summary (Last 24 hours) at 8/11/2022 0900  Last data filed at 8/11/2022 0729  Gross per 24 hour   Intake 1050 ml   Output 200 ml   Net 850 ml       Blood pressure 124/73, pulse (!) 106, temperature 98.2 °F (36.8 °C), temperature source Oral, resp.  rate 17, height 4' 11\" (1.499 m), weight 138 lb (62.6 kg), SpO2 98 %. Physical Exam  Vitals and nursing note reviewed. Constitutional:       General: She is in acute distress (moderate). Appearance: She is ill-appearing. She is not diaphoretic. Eyes:      Extraocular Movements: Extraocular movements intact. Cardiovascular:      Rate and Rhythm: Normal rate and regular rhythm. Pulmonary:      Effort: Pulmonary effort is normal. No respiratory distress. Abdominal:      General: Bowel sounds are normal. There is no distension. Palpations: Abdomen is soft. Tenderness: There is abdominal tenderness. There is guarding. Musculoskeletal:         General: No deformity. Skin:     Coloration: Skin is not jaundiced or pale. Neurological:      General: No focal deficit present. Mental Status: She is alert and oriented to person, place, and time. Psychiatric:         Mood and Affect: Mood normal. Mood is not depressed. Behavior: Behavior normal. Behavior is cooperative.          Cognition and Memory: Cognition normal.         I have personally reviewed labs and tests showing:  Recent Labs:  Recent Results (from the past 48 hour(s))   CBC with Diff    Collection Time: 08/10/22 12:43 PM   Result Value Ref Range    WBC 23.8 (H) 4.3 - 11.1 K/uL    RBC 5.38 (H) 4.05 - 5.2 M/uL    Hemoglobin 14.2 11.7 - 15.4 g/dL    Hematocrit 44.0 35.8 - 46.3 %    MCV 81.8 79.6 - 97.8 FL    MCH 26.4 26.1 - 32.9 PG    MCHC 32.3 31.4 - 35.0 g/dL    RDW 14.0 11.9 - 14.6 %    Platelets 553 (H) 557 - 450 K/uL    MPV 9.2 (L) 9.4 - 12.3 FL    nRBC 0.00 0.0 - 0.2 K/uL    Differential Type AUTOMATED      Seg Neutrophils 80 (H) 43 - 78 %    Lymphocytes 4 (L) 13 - 44 %    Monocytes 14 (H) 4.0 - 12.0 %    Eosinophils % 0 (L) 0.5 - 7.8 %    Basophils 1 0.0 - 2.0 %    Immature Granulocytes 1 0.0 - 5.0 %    Segs Absolute 19.0 (H) 1.7 - 8.2 K/UL    Absolute Lymph # 1.0 0.5 - 4.6 K/UL    Absolute Mono # 3.3 (H) 0.1 - 1.3 K/UL    Absolute Eos # 0.0 0.0 - 0.8 K/UL    Basophils Absolute 0.2 0.0 - 0.2 K/UL    Absolute Immature Granulocyte 0.3 0.0 - 0.5 K/UL   CMP    Collection Time: 08/10/22 12:43 PM   Result Value Ref Range    Sodium 132 (L) 136 - 145 mmol/L    Potassium 3.6 3.5 - 5.1 mmol/L    Chloride 95 (L) 98 - 107 mmol/L    CO2 33 (H) 21 - 32 mmol/L    Anion Gap 4 (L) 7 - 16 mmol/L    Glucose 126 (H) 65 - 100 mg/dL    BUN 32 (H) 8 - 23 MG/DL    Creatinine 1.11 (H) 0.6 - 1.0 MG/DL    GFR African American >60 >60 ml/min/1.73m2    GFR Non- 52 (L) >60 ml/min/1.73m2    Calcium 9.9 8.3 - 10.4 MG/DL    Total Bilirubin 0.6 0.2 - 1.1 MG/DL    ALT 16 12 - 65 U/L    AST 16 15 - 37 U/L    Alk Phosphatase 100 50 - 136 U/L    Total Protein 7.9 6.3 - 8.2 g/dL    Albumin 2.8 (L) 3.2 - 4.6 g/dL    Globulin 5.1 (H) 2.3 - 3.5 g/dL    Albumin/Globulin Ratio 0.5 (L) 1.2 - 3.5     Lipase    Collection Time: 08/10/22 12:43 PM   Result Value Ref Range    Lipase 31 (L) 73 - 393 U/L   Lactic Acid (Select if patient is over 65 to rule out mesenteric ischemia)    Collection Time: 08/10/22 12:43 PM   Result Value Ref Range    Lactic Acid, Plasma 1.6 0.4 - 2.0 MMOL/L   Comprehensive Metabolic Panel w/ Reflex to MG    Collection Time: 08/11/22  6:03 AM   Result Value Ref Range    Sodium 138 136 - 145 mmol/L    Potassium 3.8 3.5 - 5.1 mmol/L    Chloride 103 98 - 107 mmol/L    CO2 28 21 - 32 mmol/L    Anion Gap 7 7 - 16 mmol/L    Glucose 100 65 - 100 mg/dL    BUN 17 8 - 23 MG/DL    Creatinine 0.65 0.6 - 1.0 MG/DL    GFR African American >60 >60 ml/min/1.73m2    GFR Non- >60 >60 ml/min/1.73m2    Calcium 9.1 8.3 - 10.4 MG/DL    Total Bilirubin 0.5 0.2 - 1.1 MG/DL    ALT 12 12 - 65 U/L    AST 10 (L) 15 - 37 U/L    Alk Phosphatase 70 50 - 130 U/L    Total Protein 6.1 (L) 6.3 - 8.2 g/dL    Albumin 2.0 (L) 3.2 - 4.6 g/dL    Globulin 4.1 (H) 2.3 - 3.5 g/dL    Albumin/Globulin Ratio 0.5 (L) 1.2 - 3.5     CBC with Auto Differential    Collection Time: 08/11/22  6:03 AM   Result Value Ref Range WBC 22.8 (H) 4.3 - 11.1 K/uL    RBC 4.78 4.05 - 5.2 M/uL    Hemoglobin 12.7 11.7 - 15.4 g/dL    Hematocrit 39.3 35.8 - 46.3 %    MCV 82.2 79.6 - 97.8 FL    MCH 26.6 26.1 - 32.9 PG    MCHC 32.3 31.4 - 35.0 g/dL    RDW 14.1 11.9 - 14.6 %    Platelets 284 410 - 458 K/uL    MPV 9.1 (L) 9.4 - 12.3 FL    nRBC 0.00 0.0 - 0.2 K/uL    Differential Type AUTOMATED      Seg Neutrophils 79 (H) 43 - 78 %    Lymphocytes 4 (L) 13 - 44 %    Monocytes 16 (H) 4.0 - 12.0 %    Eosinophils % 0 (L) 0.5 - 7.8 %    Basophils 0 0.0 - 2.0 %    Immature Granulocytes 1 0.0 - 5.0 %    Segs Absolute 17.9 (H) 1.7 - 8.2 K/UL    Absolute Lymph # 0.9 0.5 - 4.6 K/UL    Absolute Mono # 3.6 (H) 0.1 - 1.3 K/UL    Absolute Eos # 0.0 0.0 - 0.8 K/UL    Basophils Absolute 0.1 0.0 - 0.2 K/UL    Absolute Immature Granulocyte 0.2 0.0 - 0.5 K/UL   Phosphorus    Collection Time: 08/11/22  6:03 AM   Result Value Ref Range    Phosphorus 2.0 (L) 2.3 - 3.7 MG/DL       I have personally reviewed imaging studies showing: Other Studies:  CT ABDOMEN PELVIS W IV CONTRAST Additional Contrast? None   Final Result   1. Diffuse colonic wall thickening suggests an infectious/inflammatory colitis,   including C. difficile. 2. Small amount of ascites. 3. Blind-ending fluid-filled tubular structure could represent a dilated   appendix without surrounding inflammatory change. There could conceivably be   obstruction of the appendiceal orifice due to the extensive mucosal thickening. Secondary acute appendicitis is not excluded   4. Prominent mesenteric lymph nodes within the lower abdomen, most likely   reactive. XR CHEST PORTABLE   Final Result   No acute cardiopulmonary abnormality.              Current Meds:  Current Facility-Administered Medications   Medication Dose Route Frequency    rosuvastatin (CRESTOR) tablet 5 mg  5 mg Oral Daily    sodium chloride flush 0.9 % injection 5-40 mL  5-40 mL IntraVENous 2 times per day    sodium chloride flush 0.9 % injection

## 2022-08-11 NOTE — PROGRESS NOTES
Comprehensive Nutrition Assessment    Type and Reason for Visit: Initial, Positive Nutrition Screen  Malnutrition Screening Tool: Malnutrition Screen  Have you recently lost weight without trying?: 2 to 13 pounds (1 point)  Have you been eating poorly because of a decreased appetite?: Yes (1 point)  Malnutrition Screening Tool Score: 2    Nutrition Recommendations/Plan:   Continue NPO. Diet Advancement per MD.  If diet unable to be advanced past NPO/CLQ within 5 days given moderate malnutrition, nutrition support may be considered. If nutrition support pursued, please consult RD to manage. Malnutrition Assessment:  Malnutrition Status: Moderate malnutrition  Context: Acute Illness  Findings of clinical characteristics of malnutrition:   Energy Intake:  75% or less of estimated energy requirements for 7 or more days (pt reports inability to tolerate foods r/t vomiting, diarrhea, and dry socket)  Weight Loss:  Greater than 7.5% over 3 months (MD office weight of 159# 5/2022)     Body Fat Loss:  Mild body fat loss Fat Overlying Ribs, Triceps, Orbital   Muscle Mass Loss:  Mild muscle mass loss Scapula (trapezius), Clavicles (pectoralis & deltoids), Temples (temporalis)  Fluid Accumulation:  Unable to assess     Strength:  Not Performed    Nutrition Assessment:  Nutrition History: Pt reports poor PO intake and appetite starting 6/2022 r/t tooth pain and subsequent tooth extraction. Pt reports she developed dry socket following extraction and a combination of antiobiotics and tooth pain resulted in diarrhea, vomiting and limited PO intake. Pt states UBW of ~160lb, most recent weight at PCP 8/8/2022 of 139lb. Pt reports trying oral nutrition supplements following oral surgery, however unable to tolerate.       Do You Have Any Cultural, Confucianist, or Ethnic Food Preferences?: No   Nutrition Background:  Pt with PMH significant for hypertension, hyperlipidemia, and diabetes,   who was admitted by the hospitalist from the ER on 8/10/22 after she presented with severe constant and intractable abdominal pain. She reported the diarrhea began about 4 weeks ago following tooth extraction. CT evidence for diffuse colonic thickening and an abnormal appendix. Nutrition Interval:  Pt seen at bedside, family present. Pt provided nutrition hx as above. Pt states diarrhea/nausea continues, however she reports feeling hungry. Pt understands current NPO status. Surgery consulted, awaiting cultures. Current Nutrition Therapies:  Diet NPO Exceptions are: Sips of Water with Meds, Ice Chips    Current Intake:   Average Meal Intake: NPO Average Supplements Intake: NPO      Anthropometric Measures:  Height: 4' 11\" (149.9 cm)  Current Body Wt: 138 lb 7.2 oz (62.8 kg) (8/11), Weight source: Bed Scale  BMI: 27.9, Overweight (BMI 25.0-29. 9)     Ideal Body Weight (Kg) (Calculated): 43 kg (95 lbs), 145.7 %  Usual Body Wt: 159 lb (72.1 kg) (6/2022 MD office weight), Percent weight change: -12.9       Edema:    BMI Category Overweight (BMI 25.0-29. 9)  Estimated Daily Nutrient Needs:  Energy (kcal/day): 6585-1400 (Kcal/kg Weight used: 62.8 kg Current  Protein (g/day): 75-88 (1.2-1.4g/kg) Weight Used: (Current) 62.8 kg  Fluid (ml/day):   (1 ml/kcal)    Nutrition Diagnosis:   Inadequate oral intake related to altered GI function as evidenced by NPO or clear liquid status due to medical condition (possible appendicitis pending surgical intervention)    Moderate malnutrition, In context of acute illness or injury related to inadequate protein-energy intake as evidenced by Criteria as identified in malnutrition assessment    Nutrition Interventions:   Food and/or Nutrient Delivery: Continue NPO     Coordination of Nutrition Care: Continue to monitor while inpatient, Interdisciplinary Rounds       Goals:       Active Goal:  (Advance and tolerate diet > CLQ by next RD assessment)       Nutrition Monitoring and Evaluation:      Food/Nutrient Intake Outcomes: Diet Advancement/Tolerance  Physical Signs/Symptoms Outcomes: Weight    Discharge Planning:     Too soon to determine    Ziggy Nino) Alistair Valenzuela, [address], [place]  Contact: [phone]

## 2022-08-11 NOTE — PROGRESS NOTES
OCCUPATIONAL THERAPY Initial Assessment and AM       OT Visit Days: 1  Acknowledge Orders  Time  OT Charge Capture  Rehab Caseload Tracker      Katherne Sicard is a 77 y.o. female   PRIMARY DIAGNOSIS: Sepsis without septic shock ([facility]  Colitis [K52.9]  MILL[facility]acute [facility]) ([facility] [N17.9]  Leukocytosis, unspecified type [D72.829]  Acute appendicitis with generalized peritonitis, unspecified whether abscess present, unspecified whether gangrene present, unspecified whether perforation present [K35.20]  Sepsis without septic shock ([facility] [A41.9]       Reason for Referral: Generalized Muscle Weakness (M62.81)  Other lack of cordination (R27.8)  Difficulty in walking, Not elsewhere classified (R26.2)  Inpatient: Payor: Ashley Kwan / Plan: Tiffanie Camps / Product Type: *No Product type* /     ASSESSMENT:     REHAB RECOMMENDATIONS:   Recommendation to date pending progress:  [address] [place] [address] but may not need depending on progress    Equipment:    To Be Determined     ASSESSMENT:  Ms. Clint Lopez admitted with above diagnosis. Pt has had nausea, vomiting and diarrhea for the last 2 weeks with abdominal pain. She is very weak, not feeling well and NPO but states she is hungry. Pt presents with generalized weakness, decrease self care, endurance and functional mobility. Pt would benefit from further OT to increase independence. This am, pt worked on functional transfers, mobility and toileting and hygiene. Pt went back to bed with abdominal pain. Pt left supine with needs in reach, mother present and RN notified.       [address] [facility] [address] [facility] [place] [address] [address] AM-PAC 6 Clicks Daily Activity Inpatient Short Form:    AM-PAC Daily Activity Inpatient   How much help for putting on and taking off regular lower body clothing?: A Lot  How much help for Bathing?: A Little  How much help for Toileting?: A Little  How much help for putting on and taking off regular upper body clothing?: A Little  How much help for taking care of personal grooming?: A Little  How much help for eating meals?: None  AM-PAC Inpatient Daily Activity Raw Score: 18  AM-PAC Inpatient ADL T-Scale Score : 38.66  ADL Inpatient CMS 0-100% Score: 46.65  ADL Inpatient CMS G-Code Modifier : CK           SUBJECTIVE:     Ms. Alicia Cardona states, \"I am so weak\"     Social/Functional Lives With: Alone  Type of Home: Apartment  Home Layout: One level  Home Access: Level entry  Bathroom Shower/Tub: Tub/Shower unit    OBJECTIVE:     Renetta Us / Aneta Pichardo / Darnell Kwon: IV    RESTRICTIONS/PRECAUTIONS:       PAIN: VITALS / O2:   Pre Treatment:   Pain Assessment:  (pt with abdominal pain; did not rate)      Post Treatment: abdominal pain; RN notified       Vitals          Oxygen            GROSS EVALUATION: INTACT IMPAIRED   (See Comments)   UE AROM [] []Generally decreased, generalized weakness   UE PROM [] []   Strength []  Generally decreased, generalized weakness     Posture / Balance []  decreased   Sensation [x]     Coordination [x]       Tone [x]       Edema []    Activity Tolerance []  Limited by Fatigue     Hand Dominance R [x] L []      COGNITION/  PERCEPTION: INTACT IMPAIRED   (See Comments)   Orientation [x]     Vision [x]     Hearing [x]     Cognition  [x]     Perception [x]       MOBILITY: I Mod I S SBA CGA Min Mod Max Total  NT x2 Comments:   Bed Mobility    Rolling [] [] [] [] [] [] [] [] [] [] []    Supine to Sit [] [] [] [] [x] [] [] [] [] [] []    Scooting [] [] [] [] [x] [] [] [] [] [] []    Sit to Supine [] [] [] [] [x] [] [] [] [] [] []    Transfers    Sit to Stand [] [] [] [] [x] [] [] [] [] [] []    Bed to Chair [] [] [] [] [x] [] [] [] [] [] []    Stand to Sit [] [] [] [] [x] [] [] [] [] [] []    Tub/Shower [] [] [] [] [] [] [] [] [] [] []     Toilet [] [] [] [] [x] [] [] [] [] [] []      [] [] [] [] [] [] [] [] [] [] []    I=Independent, Mod I=Modified Independent, S=Supervision/Setup, SBA=Standby Assistance, CGA=Contact Guard Assistance, Min=Minimal Assistance, Mod=Moderate Assistance, Max=Maximal Assistance, Total=Total Assistance, NT=Not Tested    ACTIVITIES OF DAILY LIVING: I Mod I S SBA CGA Min Mod Max Total NT Comments   BASIC ADLs:              Upper Body Bathing  [] [] [] [] [] [] [] [] [] [x]    Lower Body Bathing [] [] [] [] [] [] [] [] [] [x]    Toileting [] [] [x] [] [] [] [] [] [] []    Upper Body Dressing [] [] [x] [] [] [] [] [] [] []    Lower Body Dressing [] [] [] [] [] [x] [] [] [] []    Feeding [] [] [] [] [] [] [] [] [] []    Grooming [] [] [x] [] [] [] [] [] [] []    Personal Device Care [] [] [] [] [] [] [] [] [] []    Functional Mobility [] [] [] [] [x] [] [] [] [] []    I=Independent, Mod I=Modified Independent, S=Supervision/Setup, SBA=Standby Assistance, CGA=Contact Guard Assistance, Min=Minimal Assistance, Mod=Moderate Assistance, Max=Maximal Assistance, Total=Total Assistance, NT=Not Tested    PLAN:     FREQUENCY/DURATION   OT Plan of Care: 3 times/week for duration of hospital stay or until stated goals are met, whichever comes first.    ACUTE OCCUPATIONAL THERAPY GOALS:   (Developed with and agreed upon by patient and/or caregiver.)  1. Patient will perform grooming with supervision. 2. Patient will perform upper body dressing with supervision. 3. Patient will perform lower body dressing with supervision. 4. Patient will perform bathing with supervision. 5. Patient will perform toileting and toilet transfer with supervision. 6. Patient will perform ADL functional mobility and tranfers in room with supervision. 7. Patient/family to demonstrate knowledge of home safety and DME recommendations. Goals to be achieved in 7 days.        PROBLEM LIST:   (Skilled intervention is medically necessary to address:)  Decreased ADL/Functional Activities  Decreased Activity Tolerance  Decreased Balance  Decreased Strength  Decreased Transfer Abilities   INTERVENTIONS PLANNED:  (Benefits and precautions of occupational therapy have been discussed with the patient.)  Self Care Training  Therapeutic Activity  Therapeutic Exercise/HEP  Neuromuscular Re-education  Manual Therapy  Education         TREATMENT:     EVALUATION: LOW COMPLEXITY: (Untimed Charge)    TREATMENT:   Co-Treatment PT/OT necessary due to patient's decreased overall endurance/tolerance levels, as well as need for high level skilled assistance to complete functional transfers/mobility and functional tasks  Self Care (15 minutes): Patient participated in toileting, grooming, and dressing ADLs in supported sitting and unsupported sitting with minimal verbal cueing to increase independence. Patient also participated in functional mobility and functional transfer training to increase independence.      TREATMENT GRID:  N/A    AFTER TREATMENT PRECAUTIONS: Bed, Bed/Chair Locked, Call light within reach, Needs within reach, Side rails x3, and Visitors at bedside    INTERDISCIPLINARY COLLABORATION:  RN/ PCT, PT/ PTA, and OT/ SAUCEDA    EDUCATION:  Education Given To: Patient  Education Provided: Role of Therapy  Education Method: Demonstration  Barriers to Learning: None  Education Outcome: Verbalized understanding;Demonstrated understanding    TOTAL TREATMENT DURATION AND TIME:  Time In: 0830  Time Out: 8578  Minutes: 2700 [facility], OT

## 2022-08-11 NOTE — PROGRESS NOTES
Attempted to place dunaway catheter. Not successful. External catheter  placed ubtil night shift staff can attempt to place catheter. Attempted to call Dr. Tim Wayne to notify him unable to place catheter.

## 2022-08-11 NOTE — CONSULTS
H&P/Consult Note/Progress Note/Office Note:   Swapnil Alegre  MRN: [id number]  :1955  Age:66 y.o.    HPI: Swapnil Alegre is a 77 y.o. female with history of hypertension, hyperlipidemia, and diabetes,   who was admitted by the hospitalist from the ER on 8/10/22 after she presented with severe, constant, and intractable abdominal pain. Nothing made pain better or worse  The abd pain was associated with copious diarrhea and she reported loose watery stools 4-6 times a day leading up to admission. She reported the diarrhea began about 4 wks ago. This started after she took about 5 weeks of PO Abx related to an infected dental extraction. She had 1 week of Clindamycin, followed by 2 wks of amoxicillin, followed by 2 wks of Pen-VK. Her abd pain and diarrhea have worsened over that period of time and she has developed associated nausea and vomiting. She was seen in the ER 2 weeks previously with nausea and vomiting. On 8/10/22 she was found to have leukocytosis and sepsis without shock. Her heart rate was 106. She was treated for empirically for infectious diarrhea with metronidazole and also given cefepime initially    Stool cultures were ordered. Stool C Diff toxin assay was + for C Diff infection  Blood cultures x2 were obtained. She was hydrated for creatinine of 1.1. CT imaging was performed as shown below    General surgery was consulted by Dr. Rosalia Oswald at 6:24 PM on 8/10/22 but general surgery was not notified until a.m. of 22.      8/10/22 CT abd/pelvis with IV but no oral contrast  CT ABDOMEN: There is no hepatic or splenic lesion. The pancreas and adrenal glands unremarkable. The kidneys enhance symmetrically without discrete abnormality. There is a small amount of ascites within the right abdomen. There is diffuse relatively extensive colonic wall thickening, particularly of the ascending colon. There is no small bowel dilatation.  There are few mesenteric lymph nodes one of which measures up to 17 mm in short axis within the lower mid abdomen. There is a fluid filled tubular structure extending superiorly from the cecum. This could represent a dilated appendix measuring up to 16 mm in diameter although there are no surrounding inflammatory changes. CT PELVIS: Continued wall thickening of the colon is present to the level of the rectum. The pelvic structures are unremarkable. No aggressive osseous lesion is present. IMPRESSION:  1. Diffuse colonic wall thickening suggests an infectious/inflammatory colitis, including C. difficile. 2. Small amount of ascites. 3. Blind-ending fluid-filled tubular structure could represent a dilated appendix without surrounding inflammatory change. There could conceivably be obstruction of the appendiceal orifice due to the extensive mucosal thickening. Secondary acute appendicitis is not excluded. 4. Prominent mesenteric lymph nodes within the lower abdomen, most likely reactive.              Past Medical History:   Diagnosis Date    Diabetes mellitus ([facility] [address] 2014    not on medications hGB a1C 6.5    Endometriosis     total hysterectomy w/o BSO    Hyperlipidemia     Hypertension     not taking medication    Menopause      Past Surgical History:   Procedure Laterality Date    BREAST BIOPSY      BREAST SURGERY      benign lump    HYSTERECTOMY (CERVIX STATUS UNKNOWN)  1984    Total for endometriosis    HYSTERECTOMY, TOTAL ABDOMINAL (CERVIX REMOVED)  1983    ovaries intact     Current Facility-Administered Medications   Medication Dose Route Frequency    saccharomyces boulardii (FLORASTOR) capsule 250 mg  250 mg Oral BID    metronidazole (FLAGYL) 500 mg in 0.9% NaCl 100 mL IVPB premix  500 mg IntraVENous Q6H    vancomycin (VANCOCIN) oral solution 500 mg  500 mg Oral 4 times per day    sodium chloride flush 0.9 % injection 5-40 mL  5-40 mL IntraVENous PRN    ondansetron (ZOFRAN-ODT) disintegrating tablet 4 mg  4 mg Oral Q8H PRN Or    ondansetron (ZOFRAN) injection 4 mg  4 mg IntraVENous Q6H PRN    acetaminophen (TYLENOL) tablet 650 mg  650 mg Oral Q6H PRN    potassium chloride (KLOR-CON M) extended release tablet 40 mEq  40 mEq Oral PRN    Or    potassium bicarb-citric acid (EFFER-K) effervescent tablet 40 mEq  40 mEq Oral PRN    Or    potassium chloride 10 mEq/100 mL IVPB (Peripheral Line)  10 mEq IntraVENous PRN    magnesium sulfate 2000 mg in 50 mL IVPB premix  2,000 mg IntraVENous PRN    sodium phosphate 10 mmol in sodium chloride 0.9 % 250 mL IVPB  10 mmol IntraVENous PRN    Or    sodium phosphate 15 mmol in sodium chloride 0.9 % 250 mL IVPB  15 mmol IntraVENous PRN    Or    sodium phosphate 20 mmol in sodium chloride 0.9 % 500 mL IVPB  20 mmol IntraVENous PRN    lactated ringers infusion   IntraVENous Continuous    morphine (PF) injection 2 mg  2 mg IntraVENous Q4H PRN     Facility-Administered Medications Ordered in Other Encounters   Medication Dose Route Frequency    sodium chloride flush 0.9 % injection 10 mL  10 mL IntraVENous ONCE PRN     ALLERGIES:  Patient has no known allergies. Social History     Socioeconomic History    Marital status: Single     Spouse name: None    Number of children: None    Years of education: None    Highest education level: None   Tobacco Use    Smoking status: Never    Smokeless tobacco: Never   Substance and Sexual Activity    Alcohol use: No    Drug use: No     Social History     Tobacco Use   Smoking Status Never   Smokeless Tobacco Never     Family History   Problem Relation Age of Onset    Colon Cancer Neg Hx     Ovarian Cancer Neg Hx     Breast Cancer Neg Hx     No Known Problems Brother     No Known Problems Brother     No Known Problems Sister     No Known Problems Father     Osteoarthritis Mother     Uterine Cancer Neg Hx      ROS: The patient has no difficulty with chest pain or shortness of breath. No fever or chills.   Comprehensive review of systems was otherwise unremarkable except as noted above. Physical Exam:   /63   Pulse (!) 102   Temp 97.7 °F (36.5 °C) (Oral)   Resp 17   Ht 4' 11\" (1.499 m)   Wt 138 lb 6.4 oz (62.8 kg)   SpO2 97%   BMI 27.95 kg/m²   Vitals:    08/11/22 0729 08/11/22 0945 08/11/22 0951 08/11/22 1139   BP: 124/73   127/63   Pulse: (!) 106   (!) 102   Resp: 17      Temp: 98.2 °F (36.8 °C)   97.7 °F (36.5 °C)   TempSrc: Oral   Oral   SpO2: 98%   97%   Weight:  138 lb 6.4 oz (62.8 kg)     Height:   4' 11\" (1.499 m)      08/11 0701 - 08/11 1900  In: -   Out: 200 [Urine:200]  08/09 1901 - 08/11 0700  In: 1050   Out: -     Constitutional: Alert, oriented, cooperative patient;  appears stated age    Eyes:Sclera are clear. EOMs intact  ENMT: no external lesions gross hearing normal; no obvious neck masses, no ear or lip lesions, nares normal  CV: RRR. Normal perfusion  Resp: No JVD. Breathing is  non-labored; no audible wheezing. GI: Moderate distention with diffuse tenderness       Musculoskeletal: unremarkable with normal function. No embolic signs or cyanosis.    Neuro:  Oriented; moves all 4; no focal deficits  Psychiatric: normal affect and mood, no memory impairment    Recent vitals (if inpt):  Patient Vitals for the past 24 hrs:   BP Temp Temp src Pulse Resp SpO2 Height Weight   08/11/22 1139 127/63 97.7 °F (36.5 °C) Oral (!) 102 -- 97 % -- --   08/11/22 0951 -- -- -- -- -- -- 4' 11\" (1.499 m) --   08/11/22 0945 -- -- -- -- -- -- -- 138 lb 6.4 oz (62.8 kg)   08/11/22 0729 124/73 98.2 °F (36.8 °C) Oral (!) 106 17 98 % -- --   08/11/22 0339 (!) 149/73 97.9 °F (36.6 °C) Oral 97 16 96 % -- --   08/10/22 2324 122/71 97.9 °F (36.6 °C) Oral 98 16 97 % -- --   08/10/22 2013 (!) 137/59 98.4 °F (36.9 °C) Oral 95 16 100 % -- --   08/10/22 1828 127/63 98.2 °F (36.8 °C) Oral (!) 108 -- 100 % -- --   08/10/22 1729 128/60 -- -- 99 18 97 % -- --   08/10/22 1659 131/63 -- -- 98 16 97 % -- --   08/10/22 1629 (!) 123/59 -- -- (!) 101 18 97 % -- --   08/10/22 1600 (!) 123/58 -- -- (!) 103 16 98 % -- --   08/10/22 1459 129/67 -- -- 98 18 96 % -- --   08/10/22 1435 118/66 -- -- -- 18 99 % -- --   08/10/22 1355 117/60 -- -- 97 18 99 % -- --       Amount and/or Complexity of Data Reviewed and Analyzed:  I reviewed and analyzed all of the unique labs and radiologic studies that are shown below as well as any that are in the HPI, and any that are in the expanded problem list below  *Each unique test, order, or document contributes to the combination of 2 or combination of 3 in Category 1 below. For this visit I also reviewed old records and prior notes.       Recent Labs     08/11/22  0603   WBC 22.8*   HGB 12.7         K 3.8      CO2 28   BUN 17   ALT 12     Review of most recent CBC  Lab Results   Component Value Date    WBC 22.8 (H) 08/11/2022    HGB 12.7 08/11/2022    HCT 39.3 08/11/2022    MCV 82.2 08/11/2022     08/11/2022       Review of most recent BMP  Lab Results   Component Value Date/Time     08/11/2022 06:03 AM    K 3.8 08/11/2022 06:03 AM     08/11/2022 06:03 AM    CO2 28 08/11/2022 06:03 AM    BUN 17 08/11/2022 06:03 AM    CREATININE 0.65 08/11/2022 06:03 AM    GLUCOSE 100 08/11/2022 06:03 AM    CALCIUM 9.1 08/11/2022 06:03 AM        Review of most recent LFTs (and lipase if done)  Lab Results   Component Value Date    ALT 12 08/11/2022    AST 10 (L) 08/11/2022    ALKPHOS 70 08/11/2022    BILITOT 0.5 08/11/2022     Lab Results   Component Value Date    LIPASE 31 (L) 08/10/2022       No results found for: INR, APTT, LCAD    Review of most recent HgbA1c  No results found for: LABA1C  No results found for: EAG    Nutritional assessment screen for wound healing issues:  Lab Results   Component Value Date    LABALBU 2.0 (L) 08/11/2022       @lastcovr@    Xray Result (most recent):  XR CHEST PORTABLE 08/10/2022    Narrative  EXAM: XR CHEST PORTABLE  INDICATION: sepsis  COMPARISON: Chest radiograph, 1/16/2016    FINDINGS:  The cardiomediastinal silhouette is within normal limits. No focal parenchymal  process. No pleural effusion. No pneumothorax. No acute osseous abnormality. Impression  No acute cardiopulmonary abnormality. CT Result (most recent):  CT ABDOMEN PELVIS W IV CONTRAST 08/10/2022    Narrative  CT ABDOMEN AND PELVIS WITH CONTRAST    HISTORY:  Abdominal pain and vomiting x2 weeks. Infrequent bowel movements. TECHNIQUE: Helically acquired images were obtained from the domes of the  diaphragms to the ischial tuberosities reconstructed at 5mm intervals after the  uneventful administration of 100c's of intravenous Isovue-370 in order to better  evaluate the solid abdominal viscera and vascular structures. Oral contrast was  not administered per the emergency imaging BMI protocol. Coronal and sagittal  reformatted images were submitted. Radiation dose reduction techniques were used for this study:  Our CT scanners  use one or all of the following: Automated exposure control, adjustment of the  mA and/or kVp according to patient's size, iterative reconstruction. COMPARISON: None. Correlation is made to the abdominal series 07/25/2022. CT ABDOMEN: There is no hepatic or splenic lesion. The pancreas and adrenal  glands unremarkable. The kidneys enhance symmetrically without discrete  abnormality. There is a small amount of ascites within the right abdomen. There is diffuse  relatively extensive colonic wall thickening, particularly of the ascending  colon. There is no small bowel dilatation. There are few mesenteric lymph nodes  one of which measures up to 17 mm in short axis within the lower mid abdomen. There is a fluid filled tubular structure extending superiorly from the cecum. This could represent a dilated appendix measuring up to 16 mm in diameter  although there are no surrounding inflammatory changes. CT PELVIS: Continued wall thickening of the colon is present to the level of the  rectum.  The pelvic structures are unremarkable. No aggressive osseous lesion is  present. Impression  1. Diffuse colonic wall thickening suggests an infectious/inflammatory colitis,  including C. difficile. 2. Small amount of ascites. 3. Blind-ending fluid-filled tubular structure could represent a dilated  appendix without surrounding inflammatory change. There could conceivably be  obstruction of the appendiceal orifice due to the extensive mucosal thickening. Secondary acute appendicitis is not excluded  4. Prominent mesenteric lymph nodes within the lower abdomen, most likely  reactive. US Result (most recent):  US BREAST LIMITED 03/11/2021    Narrative  This is a summary report. The complete report is available in the patient's medical record. If you cannot access the medical record, please contact the sending organization for a detailed fax or copy. BILATERAL DIAGNOSTIC DIGITAL MAMMOGRAPHY WITH TOMOSYNTHESIS,  BILATERAL BREAST SONOGRAPHY:    CLINICAL HISTORY:  72year-old status post prior benign left surgical biopsy  presents for evaluation of intermittent pain laterally in the left breast for  approximately 6 months. COMPARISON:  The patient reports prior outside mammograms, but none could be  located for comparison at this time. BILATERAL MAMMOGRAM:  No palpable lump was reported for placement of a skin  marker at the time of examination. Bilateral craniocaudal and mediolateral  oblique views with digital tomography as well as a left lateral view demonstrate  scattered fibroglandular tissue bilaterally. There are a few scattered  typically benign bilateral calcifications. There is a very small nodule  centrally at the right 10:00 position adjacent to a blood vessel.   No other  discrete soft tissue mass, cluster of suspicious microcalcifications, or other  evidence of malignancy is seen in either breast.    BILATERAL ULTRASOUND:  Images from careful ultrasound evaluation demonstrate a 3  mm lymph node with distinct fatty and vascular hilum centrally at the right  10:00 position 9 cm from the nipple corresponding well in size, configuration,  and location with the mammographic nodule. Images from the painful lateral left  breast demonstrate no discrete cystic or solid mass, and no abnormal acoustical  shadowing suspicious for malignancy is seen. Nevertheless, it should be noted  that mammography and ultrasound fail to detect a small percentage of carcinoma,  and therefore any area of persistent painful concern must be managed clinically. Impression  INCIDENTAL RIGHT CENTRAL 10:00 SMALL INTRAMAMMARY LYMPH NODE WITH NO DEFINITE  EVIDENCE OF MALIGNANCY IN EITHER BREAST. FOLLOW-UP BILATERAL SCREENING  MAMMOGRAPHY IS RECOMMENDED IN ONE YEAR. BI-RADS Assessment Category 2: Benign finding. BD2      Admission date (for inpatients): 8/10/2022   * No surgery found *  * No surgery found *        ASSESSMENT/PLAN:  [unfilled]  Principal Problem:    Sepsis without septic shock ([facility] [address]  Active Problems:    Disorder of fluid or electrolyte    Hyponatremia    Hypoproteinemia (HCC)    C. difficile colitis    Infectious diarrhea    Leukocytosis    Generalized abdominal pain    Abnormal CT of the abdomen    Hypertension    Diabetes mellitus ([address]    Hyperlipidemia  Resolved Problems:    * No resolved hospital problems.  *     Patient Active Problem List    Diagnosis Date Noted    Hypoproteinemia ([address] 08/11/2022     Priority: Medium    C. difficile colitis 08/11/2022     Priority: Medium    Infectious diarrhea 08/11/2022     Priority: Medium    Leukocytosis 08/11/2022     Priority: Medium    Generalized abdominal pain 08/11/2022     Priority: Medium    Abnormal CT of the abdomen 08/11/2022     Priority: Medium    Sepsis without septic shock ([address] 08/10/2022     Priority: Medium    Disorder of fluid or electrolyte 08/10/2022     Priority: Medium    Hyponatremia 08/10/2022     Priority: Medium    Hypertension      not taking medication        Diabetes mellitus ([facility] [address]     Hyperlipidemia           Number and Complexity of Problems addressed and   Risks of complications and/or morbidity of management      She has abdominal pain, diarrhea, sepsis without hypotension, tachycardia, leukocytosis, and CT evidence for diffuse colonic thickening and an abnormal appendix which is likely secondary. Her clinical history and her signs and symptoms are consistent with possible C. difficile colitis. Stool studies are + for C Diff toxin      Inpt admission  She has been placed on bowel rest, NPO except meds  Hydration with LR  Cortez to follow urine output  IV Flagyl 500 mg q6hrs for now as her ileus may preclude adequate delivery of p.o. vancomycin to the distal GI tract initially. Vancomycin 500 mg PO qid  Consider Dificid 100 mg BID for 10 days depending on how she tolerates PO Vanc  Florastor  Await blood cultures  Admission lactic acid was normal    I discussed with the patient and family that sometimes symptoms progressed to the point we have to proceed with emergent colectomy and ileostomy. We also discussed that it is unclear if she is developing acute appendicitis which could need treatment as well or whether the appendix is just secondarily involved. I discussed with patient and family the life-threatening nature of her current illness. Her pain is diffuse in its location and not localized to RLQ/appendix    I spoke to Dr Mackenzie Northern the day of admission to coordinate care  Surgery will see her daily  Emergent colectomy and ileostomy if needed  Moved to ICU if needed. Level of MDM (2/3 elements below)  Number and Complexity of Problems Addressed Amount and/or Complexity of Data to be Reviewed and Analyzed  *Each unique test, order, or document contributes to the combination of 2 or combination of 3 in Category 1 below.  Risk of Complications and/or Morbidity or Mortality of pt Management     [id number] [phone] [facility] Minimal  ?1self-limited or minor problem Minimal or none Minimal risk of morbidity from additional diagnostic testing or Rx   [id number]  [id number] Low Low  ? 2or more self-limited or minor problems;    or  ? 1stable chronic illness;    or  ?1acute, uncomplicated illness or injury   Limited  (Must meet the requirements of at least 1 of the 2 categories)  Category 1: Tests and documents   ? Any combination of 2 from the following:  ?Review of prior external note(s) from each unique source*;  ?review of the result(s) of each unique test*;   ?ordering of each unique test*    or   Category 2: Assessment requiring an independent historian(s)  (For the categories of independent interpretation of tests and discussion of management or test interpretation, see moderate or high) Low risk of morbidity from additional diagnostic testing or treatment     [id number]  [id number] Mod Moderate  ? 1or more chronic illnesses with exacerbation, progression, or side effects of treatment;    or  ?2or more stable chronic illnesses;    or  ?1undiagnosed new problem with uncertain prognosis;    or  ?1acute illness with systemic symptoms;    or  ?1acute complicated injury   Moderate  (Must meet the requirements of at least 1 out of 3 categories)  Category 1: Tests, documents, or independent historian(s)  ? Any combination of 3 from the following:   ?Review of prior external note(s) from each unique source*;  ?Review of the result(s) of each unique test*;  ?Ordering of each unique test*;  ?Assessment requiring an independent historian(s)    or  Category 2: Independent interpretation of tests   ? Independent interpretation of a test performed by another physician/other qualified health care professional (not separately reported);     or  Category 3: Discussion of management or test interpretation  ? Discussion of management or test interpretation with external physician/other qualified health care professional/appropriate source (not separately reported)   Moderate risk of morbidity from additional diagnostic testing or treatment  Examples only:  ?Prescription drug management   ? Decision regarding minor surgery with identified patient or procedure risk factors  ? Decision regarding elective major surgery without identified patient or procedure risk factors   ? Diagnosis or treatment significantly limited by social determinants of health       [id number] High High  ? 1or more chronic illnesses with severe exacerbation, progression, or side effects of treatment;    or  ?1 acute or chronic illness or injury that poses a threat to life or bodily function   Extensive  (Must meet the requirements of at least 2 out of 3 categories)  Category 1: Tests, documents, or independent historian(s)  ? Any combination of 3 from the following:   ?Review of prior external note(s) from each unique source*;  ?Review of the result(s) of each unique test*;   ?Ordering of each unique test*;   ?Assessment requiring an independent historian(s)    or   Category 2: Independent interpretation of tests   ? Independent interpretation of a test performed by another physician/other qualified health care professional (not separately reported);     or  Category 3: Discussion of management or test interpretation  ? Discussion of management or test interpretation with external physician/other qualified health care professional/appropriate source (not separately reported)   High risk of morbidity from additional diagnostic testing or treatment  Examples only:  ?Drug therapy requiring intensive monitoring for toxicity  ? Decision regarding elective major surgery with identified patient or procedure risk factors  ? Decision regarding emergency major surgery  ? Decision regarding hospitalization  ? Decision not to resuscitate or to de-escalate care because of poor prognosis             I have personally performed a face-to-face diagnostic evaluation and management  service on this patient.       I have independently seen the patient. I have independently obtained the above history from the patient/family. I have independently examined the patient with above findings. I have independently reviewed data/labs for this patient and developed the above plan of care (MDM).       Signed: Carlee Omalley MD  8/11/2022    1:38 PM

## 2022-08-11 NOTE — PROGRESS NOTES
Medical Student Progress Note   Admit Date:  8/10/2022 12:11 PM   Name:  Moo Sandoval   Age:  77 y.o. Sex:  female  :  1955   MRN:  [id number]     Presenting Complaint: Abdominal Pain    Reason(s) for Admission: Colitis [K52.9]  MILLA (acute kidney injury) ([facility] [address] [N17.9]  Leukocytosis, unspecified type [D72.829]  Acute appendicitis with generalized peritonitis, unspecified whether abscess present, unspecified whether gangrene present, unspecified whether perforation present [K35.20]  Sepsis without septic shock [facility]) [A41.9]       Hospital Course & Interval History:       Subjective (22): Gillian Mcclendon is a 71yo female with a history hypertension, hyperlipidemia, and diabetes who presented yesterday to the ED with abdominal pain. Assessment & Plan:   Sepsis  WBC 22.9 is still elevated as of this morning. Patient is tachycardic but reports significant improvement in abdominal pain and nausea. Patient did not have diarrhea or vomiting last night and was able to give a stool sample this morning.  -Continue cefepime and metronidazole and follow cultures  -recheck CBC    Acute Kidney Injury  sCr improved to 0.65  -Recheck CMP at 11:00  -Avoid nephrotoxic substances     Fluid or electrolyte disorder  Hypophosphatemia  -Replete     Hypertension  -Hold lisinopril and hydrochlorothiazide       Diabetes mellitus  Admission serum glucose 126  -Start insulin if needed    Hyperlipidemia  - Continue Rosuvastatin    Diet:  Diet NPO  DVT PPx: Enoxaparin  Code status: Full    Physical Exam  Constitutional:       General: She is in acute distress. Appearance: She is ill-appearing. HENT:      Head: Normocephalic and atraumatic. Eyes:      Conjunctiva/sclera: Conjunctivae normal.   Cardiovascular:      Rate and Rhythm: Tachycardia present. Abdominal:      Tenderness: There is abdominal tenderness. Neurological:      Mental Status: She is alert and oriented to person, place, and time.         Active Hospital Problems    Diagnosis Date Noted    Sepsis without septic shock ([facility] 08/10/2022     Priority: Medium    Disorder of fluid or electrolyte 08/10/2022     Priority: Medium    Hyponatremia 08/10/2022     Priority: Medium    Hypertension      not taking medication        Diabetes mellitus ([facility]     Hyperlipidemia      Objective:   Patient Vitals for the past 24 hrs:   Temp Pulse Resp BP SpO2   08/11/22 0729 98.2 °F (36.8 °C) (!) 106 17 124/73 98 %   08/11/22 0339 97.9 °F (36.6 °C) 97 16 (!) 149/73 96 %   08/10/22 2324 97.9 °F (36.6 °C) 98 16 122/71 97 %   08/10/22 2013 98.4 °F (36.9 °C) 95 16 (!) 137/59 100 %   08/10/22 1828 98.2 °F (36.8 °C) (!) 108 -- 127/63 100 %   08/10/22 1729 -- 99 18 128/60 97 %   08/10/22 1659 -- 98 16 131/63 97 %   08/10/22 1629 -- (!) 101 18 (!) 123/59 97 %   08/10/22 1600 -- (!) 103 16 (!) 123/58 98 %   08/10/22 1459 -- 98 18 129/67 96 %   08/10/22 1435 -- -- 18 118/66 99 %   08/10/22 1355 -- 97 18 117/60 99 %   08/10/22 1329 -- 90 16 (!) 125/59 98 %   08/10/22 1255 -- 88 16 120/61 99 %   08/10/22 1237 -- 98 -- -- --   08/10/22 1225 -- -- 18 121/66 100 %   08/10/22 1207 97.6 °F (36.4 °C) (!) 106 18 107/66 100 %     @BSHSIFLOW(2444:last)@    Estimated body mass index is 27.87 kg/m² as calculated from the following:    Height as of this encounter: 4' 11\" (1.499 m). Weight as of this encounter: 138 lb (62.6 kg).     Intake/Output Summary (Last 24 hours) at 8/11/2022 0739  Last data filed at 8/10/2022 1610  Gross per 24 hour   Intake 1050 ml   Output --   Net 1050 ml     I have reviewed ordered lab tests and independently visualized imaging below:    Last 24hr Labs:  Recent Results (from the past 24 hour(s))   CBC with Diff    Collection Time: 08/10/22 12:43 PM   Result Value Ref Range    WBC 23.8 (H) 4.3 - 11.1 K/uL    RBC 5.38 (H) 4.05 - 5.2 M/uL    Hemoglobin 14.2 11.7 - 15.4 g/dL    Hematocrit 44.0 35.8 - 46.3 %    MCV 81.8 79.6 - 97.8 FL    MCH 26.4 26.1 - 32.9 PG    MCHC 32.3 31.4 - 35.0 g/dL    RDW 14.0 11.9 - 14.6 %    Platelets 843 (H) 335 - 450 K/uL    MPV 9.2 (L) 9.4 - 12.3 FL    nRBC 0.00 0.0 - 0.2 K/uL    Differential Type AUTOMATED      Seg Neutrophils 80 (H) 43 - 78 %    Lymphocytes 4 (L) 13 - 44 %    Monocytes 14 (H) 4.0 - 12.0 %    Eosinophils % 0 (L) 0.5 - 7.8 %    Basophils 1 0.0 - 2.0 %    Immature Granulocytes 1 0.0 - 5.0 %    Segs Absolute 19.0 (H) 1.7 - 8.2 K/UL    Absolute Lymph # 1.0 0.5 - 4.6 K/UL    Absolute Mono # 3.3 (H) 0.1 - 1.3 K/UL    Absolute Eos # 0.0 0.0 - 0.8 K/UL    Basophils Absolute 0.2 0.0 - 0.2 K/UL    Absolute Immature Granulocyte 0.3 0.0 - 0.5 K/UL   CMP    Collection Time: 08/10/22 12:43 PM   Result Value Ref Range    Sodium 132 (L) 136 - 145 mmol/L    Potassium 3.6 3.5 - 5.1 mmol/L    Chloride 95 (L) 98 - 107 mmol/L    CO2 33 (H) 21 - 32 mmol/L    Anion Gap 4 (L) 7 - 16 mmol/L    Glucose 126 (H) 65 - 100 mg/dL    BUN 32 (H) 8 - 23 MG/DL    Creatinine 1.11 (H) 0.6 - 1.0 MG/DL    GFR African American >60 >60 ml/min/1.73m2    GFR Non- 52 (L) >60 ml/min/1.73m2    Calcium 9.9 8.3 - 10.4 MG/DL    Total Bilirubin 0.6 0.2 - 1.1 MG/DL    ALT 16 12 - 65 U/L    AST 16 15 - 37 U/L    Alk Phosphatase 100 50 - 136 U/L    Total Protein 7.9 6.3 - 8.2 g/dL    Albumin 2.8 (L) 3.2 - 4.6 g/dL    Globulin 5.1 (H) 2.3 - 3.5 g/dL    Albumin/Globulin Ratio 0.5 (L) 1.2 - 3.5     Lipase    Collection Time: 08/10/22 12:43 PM   Result Value Ref Range    Lipase 31 (L) 73 - 393 U/L   Lactic Acid (Select if patient is over 65 to rule out mesenteric ischemia)    Collection Time: 08/10/22 12:43 PM   Result Value Ref Range    Lactic Acid, Plasma 1.6 0.4 - 2.0 MMOL/L   Comprehensive Metabolic Panel w/ Reflex to MG    Collection Time: 08/11/22  6:03 AM   Result Value Ref Range    Sodium 138 136 - 145 mmol/L    Potassium 3.8 3.5 - 5.1 mmol/L    Chloride 103 98 - 107 mmol/L    CO2 28 21 - 32 mmol/L    Anion Gap 7 7 - 16 mmol/L    Glucose 100 65 - 100 mg/dL    BUN 17 8 - 23 MG/DL    Creatinine 0.65 0.6 - 1.0 MG/DL    GFR African American >60 >60 ml/min/1.73m2    GFR Non- >60 >60 ml/min/1.73m2    Calcium 9.1 8.3 - 10.4 MG/DL    Total Bilirubin 0.5 0.2 - 1.1 MG/DL    ALT 12 12 - 65 U/L    AST 10 (L) 15 - 37 U/L    Alk Phosphatase 70 50 - 130 U/L    Total Protein 6.1 (L) 6.3 - 8.2 g/dL    Albumin 2.0 (L) 3.2 - 4.6 g/dL    Globulin 4.1 (H) 2.3 - 3.5 g/dL    Albumin/Globulin Ratio 0.5 (L) 1.2 - 3.5     CBC with Auto Differential    Collection Time: 08/11/22  6:03 AM   Result Value Ref Range    WBC 22.8 (H) 4.3 - 11.1 K/uL    RBC 4.78 4.05 - 5.2 M/uL    Hemoglobin 12.7 11.7 - 15.4 g/dL    Hematocrit 39.3 35.8 - 46.3 %    MCV 82.2 79.6 - 97.8 FL    MCH 26.6 26.1 - 32.9 PG    MCHC 32.3 31.4 - 35.0 g/dL    RDW 14.1 11.9 - 14.6 %    Platelets 663 773 - 309 K/uL    MPV 9.1 (L) 9.4 - 12.3 FL    nRBC 0.00 0.0 - 0.2 K/uL    Differential Type AUTOMATED      Seg Neutrophils 79 (H) 43 - 78 %    Lymphocytes 4 (L) 13 - 44 %    Monocytes 16 (H) 4.0 - 12.0 %    Eosinophils % 0 (L) 0.5 - 7.8 %    Basophils 0 0.0 - 2.0 %    Immature Granulocytes 1 0.0 - 5.0 %    Segs Absolute 17.9 (H) 1.7 - 8.2 K/UL    Absolute Lymph # 0.9 0.5 - 4.6 K/UL    Absolute Mono # 3.6 (H) 0.1 - 1.3 K/UL    Absolute Eos # 0.0 0.0 - 0.8 K/UL    Basophils Absolute 0.1 0.0 - 0.2 K/UL    Absolute Immature Granulocyte 0.2 0.0 - 0.5 K/UL   Phosphorus    Collection Time: 08/11/22  6:03 AM   Result Value Ref Range    Phosphorus 2.0 (L) 2.3 - 3.7 MG/DL       [unfilled]    Other Studies:  [unfilled]    Current Meds:  Current Facility-Administered Medications   Medication Dose Route Frequency    rosuvastatin (CRESTOR) tablet 5 mg  5 mg Oral Daily    sodium chloride flush 0.9 % injection 5-40 mL  5-40 mL IntraVENous 2 times per day    sodium chloride flush 0.9 % injection 5-40 mL  5-40 mL IntraVENous PRN    0.9 % sodium chloride infusion   IntraVENous PRN    enoxaparin (LOVENOX) injection 40 mg  40 mg SubCUTAneous Q24H ondansetron (ZOFRAN-ODT) disintegrating tablet 4 mg  4 mg Oral Q8H PRN    Or    ondansetron (ZOFRAN) injection 4 mg  4 mg IntraVENous Q6H PRN    polyethylene glycol (GLYCOLAX) packet 17 g  17 g Oral Daily PRN    acetaminophen (TYLENOL) tablet 650 mg  650 mg Oral Q6H PRN    Or    acetaminophen (TYLENOL) suppository 650 mg  650 mg Rectal Q6H PRN    potassium chloride (KLOR-CON M) extended release tablet 40 mEq  40 mEq Oral PRN    Or    potassium bicarb-citric acid (EFFER-K) effervescent tablet 40 mEq  40 mEq Oral PRN    Or    potassium chloride 10 mEq/100 mL IVPB (Peripheral Line)  10 mEq IntraVENous PRN    magnesium sulfate 2000 mg in 50 mL IVPB premix  2,000 mg IntraVENous PRN    sodium phosphate 10 mmol in sodium chloride 0.9 % 250 mL IVPB  10 mmol IntraVENous PRN    Or    sodium phosphate 15 mmol in sodium chloride 0.9 % 250 mL IVPB  15 mmol IntraVENous PRN    Or    sodium phosphate 20 mmol in sodium chloride 0.9 % 500 mL IVPB  20 mmol IntraVENous PRN    lactated ringers infusion   IntraVENous Continuous    cefepime (MAXIPIME) 2,000 mg in sodium chloride 0.9 % 50 mL IVPB mini-bag  2,000 mg IntraVENous Q24H    morphine (PF) injection 2 mg  2 mg IntraVENous Q4H PRN     Facility-Administered Medications Ordered in Other Encounters   Medication Dose Route Frequency    sodium chloride flush 0.9 % injection 10 mL  10 mL IntraVENous ONCE PRN       Signed:  Lilly Penn    Part of this note may have been written by using a voice dictation software. The note has been proof read but may still contain some grammatical/other typographical errors.

## 2022-08-11 NOTE — PROGRESS NOTES
Little  How much help from another person moving to and from a bed to a chair?: A Little  How much help from another person needed to walk in hospital room?: A Little  How much help from another person for climbing 3-5 steps with a railing?: A Little  AM-PAC Inpatient Mobility Raw Score : 18  AM-PAC Inpatient T-Scale Score : 43.63  Mobility Inpatient CMS 0-100% Score: 46.58  Mobility Inpatient CMS G-Code Modifier : CK    SUBJECTIVE:   Ms. Hammad Monreal states, \"I am so weak and I am hungry\"     Social/Functional Lives With: Alone  Type of Home: Apartment  Home Layout: One level  Home Access: Level entry  Bathroom Shower/Tub: Tub/Shower unit    OBJECTIVE:     PAIN: Tony Divers / O2: Binnie Leaf / Luke Simple / Nida Govea:   Pre Treatment: having abdominal pain, did not rate         Post Treatment: abdominal pain, RN notified Vitals        Oxygen      IV    RESTRICTIONS/PRECAUTIONS:                    GROSS EVALUATION: Intact Impaired (Comments):   AROM []  Generally decreased-generalized weakness   PROM []    Strength []  Generally decreased-generalized weakness   Balance [] Posture: Fair (forward flexed due to abdominal pain)  Sitting - Static: Good  Sitting - Dynamic: Good  Standing - Static: Fair, +  Standing - Dynamic: Fair   Posture [] Forward Head  Rounded Shoulders  Trunk Flexion leaned over due to abdominal pain   Sensation [x]     Coordination [x]      Tone [x]     Edema []    Activity Tolerance [] Patient limited by pain, Patient limited by fatigue    []      COGNITION/  PERCEPTION: Intact Impaired (Comments):   Orientation [x]     Vision [x]     Hearing [x]     Cognition  [x]       MOBILITY: I Mod I S SBA CGA Min Mod Max Total  NT x2 Comments:   Bed Mobility    Rolling [] [] [] [] [] [] [] [] [] [] []    Supine to Sit [] [] [] [] [x] [] [] [] [] [] []    Scooting [] [] [] [] [x] [] [] [] [] [] []    Sit to Supine [] [] [] [] [x] [] [] [] [] [] []    Transfers    Sit to Stand [] [] [] [] [x] [] [] [] [] [] []    Bed to Chair [] [] [] [] [x] [] [] [] [] [] []    Stand to Sit [] [] [] [] [x] [] [] [] [] [] []     [] [] [] [] [] [] [] [] [] [] []    I=Independent, Mod I=Modified Independent, S=Supervision, SBA=Standby Assistance, CGA=Contact Guard Assistance,   Min=Minimal Assistance, Mod=Moderate Assistance, Max=Maximal Assistance, Total=Total Assistance, NT=Not Tested    GAIT: I Mod I S SBA CGA Min Mod Max Total  NT x2 Comments:   Level of Assistance [] [] [] [] [x] [x] [] [] [] [] []    Distance 15 (and another 15 feet) feet    DME Rolling Walker    Gait Quality Antalgic, Decreased jonna , Decreased step clearance, and Decreased step length    Weightbearing Status      Stairs      I=Independent, Mod I=Modified Independent, S=Supervision, SBA=Standby Assistance, CGA=Contact Guard Assistance,   Min=Minimal Assistance, Mod=Moderate Assistance, Max=Maximal Assistance, Total=Total Assistance, NT=Not Tested    PLAN:   ACUTE PHYSICAL THERAPY GOALS:   (Developed with and agreed upon by patient and/or caregiver.)  STG:  (1.)Ms. Charyl Heimlich will move from supine to sit and sit to supine  with SUPERVISION within 4-7 treatment day(s). (2.)Ms. Charyl Heimlich will transfer from bed to chair and chair to bed with SUPERVISION using the least restrictive device within 4-7 treatment day(s). (3.)Ms. Charyl Heimlich will ambulate with STAND BY ASSIST for 250 feet with the least restrictive device within 4-7 treatment day(s).      ________________________________________________________________________________________________      FREQUENCY AND DURATION: Daily for duration of hospital stay or until stated goals are met, whichever comes first.    THERAPY PROGNOSIS: Good    PROBLEM LIST:   (Skilled intervention is medically necessary to address:)  Decreased ADL/Functional Activities  Decreased Activity Tolerance  Decreased Gait Ability  Decreased Strength  Decreased Transfer Abilities INTERVENTIONS PLANNED:   (Benefits and precautions of physical therapy have been discussed with the patient.)  Therapeutic Activity  Therapeutic Exercise/HEP  Gait Training  Education       TREATMENT:   EVALUATION: LOW COMPLEXITY: (Untimed Charge)    TREATMENT:   Co-Treatment PT/OT necessary due to patient's decreased overall endurance/tolerance levels, as well as need for high level skilled assistance to complete functional transfers/mobility and functional tasks  Therapeutic Activity (10 Minutes): Therapeutic activity included Supine to Sit, Sit to Supine, Ambulation on level ground, and Standing balance to improve functional Activity tolerance, Mobility, and Strength.     TREATMENT GRID:  N/A    AFTER TREATMENT PRECAUTIONS: Bed, Bed/Chair Locked, Call light within reach, and Visitors at bedside    INTERDISCIPLINARY COLLABORATION:  RN/ PCT and OT/ SAUCEDA    EDUCATION: Education Given To: Patient  Education Provided: Role of Therapy;Plan of Care  Education Outcome: Verbalized understanding    TIME IN/OUT:  Time In: 0830  Time Out: 9509  Minutes: Martinez Friend PT

## 2022-08-12 LAB
ALBUMIN SERPL-MCNC: 1.8 G/DL (ref 3.2–4.6)
ALBUMIN/GLOB SERPL: 0.5 {RATIO} (ref 1.2–3.5)
ALP SERPL-CCNC: 65 U/L (ref 50–130)
ALT SERPL-CCNC: 10 U/L (ref 12–65)
ANION GAP SERPL CALC-SCNC: 8 MMOL/L (ref 7–16)
AST SERPL-CCNC: 11 U/L (ref 15–37)
BASOPHILS # BLD: 0.1 K/UL (ref 0–0.2)
BASOPHILS NFR BLD: 1 % (ref 0–2)
BILIRUB SERPL-MCNC: 0.4 MG/DL (ref 0.2–1.1)
BUN SERPL-MCNC: 13 MG/DL (ref 8–23)
CALCIUM SERPL-MCNC: 8.9 MG/DL (ref 8.3–10.4)
CHLORIDE SERPL-SCNC: 103 MMOL/L (ref 98–107)
CO2 SERPL-SCNC: 28 MMOL/L (ref 21–32)
CREAT SERPL-MCNC: 0.53 MG/DL (ref 0.6–1)
DIFFERENTIAL METHOD BLD: ABNORMAL
EOSINOPHIL # BLD: 0.1 K/UL (ref 0–0.8)
EOSINOPHIL NFR BLD: 0 % (ref 0.5–7.8)
ERYTHROCYTE [DISTWIDTH] IN BLOOD BY AUTOMATED COUNT: 14.5 % (ref 11.9–14.6)
GLOBULIN SER CALC-MCNC: 3.9 G/DL (ref 2.3–3.5)
GLUCOSE SERPL-MCNC: 102 MG/DL (ref 65–100)
HCT VFR BLD AUTO: 37.1 % (ref 35.8–46.3)
HGB BLD-MCNC: 12 G/DL (ref 11.7–15.4)
IMM GRANULOCYTES # BLD AUTO: 0.5 K/UL (ref 0–0.5)
IMM GRANULOCYTES NFR BLD AUTO: 3 % (ref 0–5)
LYMPHOCYTES # BLD: 1.5 K/UL (ref 0.5–4.6)
LYMPHOCYTES NFR BLD: 8 % (ref 13–44)
MAGNESIUM SERPL-MCNC: 2.1 MG/DL (ref 1.8–2.4)
MCH RBC QN AUTO: 26.4 PG (ref 26.1–32.9)
MCHC RBC AUTO-ENTMCNC: 32.3 G/DL (ref 31.4–35)
MCV RBC AUTO: 81.7 FL (ref 79.6–97.8)
MONOCYTES # BLD: 3.1 K/UL (ref 0.1–1.3)
MONOCYTES NFR BLD: 17 % (ref 4–12)
NEUTS SEG # BLD: 13.1 K/UL (ref 1.7–8.2)
NEUTS SEG NFR BLD: 72 % (ref 43–78)
NRBC # BLD: 0 K/UL (ref 0–0.2)
PHOSPHATE SERPL-MCNC: 1.3 MG/DL (ref 2.3–3.7)
PLATELET # BLD AUTO: 456 K/UL (ref 150–450)
PMV BLD AUTO: 8.8 FL (ref 9.4–12.3)
POTASSIUM SERPL-SCNC: 3 MMOL/L (ref 3.5–5.1)
PROT SERPL-MCNC: 5.7 G/DL (ref 6.3–8.2)
RBC # BLD AUTO: 4.54 M/UL (ref 4.05–5.2)
SODIUM SERPL-SCNC: 139 MMOL/L (ref 136–145)
WBC # BLD AUTO: 18.3 K/UL (ref 4.3–11.1)

## 2022-08-12 PROCEDURE — 99233 SBSQ HOSP IP/OBS HIGH 50: CPT | Performed by: SURGERY

## 2022-08-12 PROCEDURE — 1100000000 HC RM PRIVATE

## 2022-08-12 PROCEDURE — 85025 COMPLETE CBC W/AUTO DIFF WBC: CPT

## 2022-08-12 PROCEDURE — 6360000002 HC RX W HCPCS: Performed by: SURGERY

## 2022-08-12 PROCEDURE — 2580000003 HC RX 258: Performed by: SURGERY

## 2022-08-12 PROCEDURE — 80053 COMPREHEN METABOLIC PANEL: CPT

## 2022-08-12 PROCEDURE — 83735 ASSAY OF MAGNESIUM: CPT

## 2022-08-12 PROCEDURE — 36415 COLL VENOUS BLD VENIPUNCTURE: CPT

## 2022-08-12 PROCEDURE — 97530 THERAPEUTIC ACTIVITIES: CPT

## 2022-08-12 PROCEDURE — 6370000000 HC RX 637 (ALT 250 FOR IP): Performed by: FAMILY MEDICINE

## 2022-08-12 PROCEDURE — 84100 ASSAY OF PHOSPHORUS: CPT

## 2022-08-12 PROCEDURE — 2500000003 HC RX 250 WO HCPCS: Performed by: SURGERY

## 2022-08-12 RX ORDER — DEXTROSE, SODIUM CHLORIDE, AND POTASSIUM CHLORIDE 5; .45; .15 G/100ML; G/100ML; G/100ML
INJECTION INTRAVENOUS CONTINUOUS
Status: DISCONTINUED | OUTPATIENT
Start: 2022-08-12 | End: 2022-08-14 | Stop reason: HOSPADM

## 2022-08-12 RX ADMIN — METRONIDAZOLE 500 MG: 500 INJECTION, SOLUTION INTRAVENOUS at 22:46

## 2022-08-12 RX ADMIN — VANCOMYCIN HYDROCHLORIDE 125 MG: 5 INJECTION, POWDER, LYOPHILIZED, FOR SOLUTION INTRAVENOUS at 17:38

## 2022-08-12 RX ADMIN — METRONIDAZOLE 500 MG: 500 INJECTION, SOLUTION INTRAVENOUS at 05:39

## 2022-08-12 RX ADMIN — METRONIDAZOLE 500 MG: 500 INJECTION, SOLUTION INTRAVENOUS at 11:32

## 2022-08-12 RX ADMIN — Medication 250 MG: at 20:05

## 2022-08-12 RX ADMIN — POTASSIUM CHLORIDE, DEXTROSE MONOHYDRATE AND SODIUM CHLORIDE: 150; 5; 450 INJECTION, SOLUTION INTRAVENOUS at 09:41

## 2022-08-12 RX ADMIN — Medication 250 MG: at 09:38

## 2022-08-12 RX ADMIN — VANCOMYCIN HYDROCHLORIDE 125 MG: 5 INJECTION, POWDER, LYOPHILIZED, FOR SOLUTION INTRAVENOUS at 11:33

## 2022-08-12 RX ADMIN — METRONIDAZOLE 500 MG: 500 INJECTION, SOLUTION INTRAVENOUS at 17:33

## 2022-08-12 ASSESSMENT — PAIN SCALES - GENERAL
PAINLEVEL_OUTOF10: 0
PAINLEVEL_OUTOF10: 0

## 2022-08-12 NOTE — CARE COORDINATION
ASSESSMENT NOTE    Attending Physician: Robb Parish MD  Admit Problem: Colitis [K52.9]  MILLA (acute kidney injury) ([facility] [address] [N17.9]  Leukocytosis, unspecified type [D72.829]  Acute appendicitis with generalized peritonitis, unspecified whether abscess present, unspecified whether gangrene present, unspecified whether perforation present [K35.20]  Sepsis without septic shock ([facility] [address] [A41.9]  Date/Time of Admission: 8/10/2022 12:11 PM  Problem List:  Patient Active Problem List   Diagnosis    Hypertension    Diabetes mellitus ([facility] [address]    Hyperlipidemia    Sepsis without septic shock ([facility] [address] [address]    Disorder of fluid or electrolyte    Hyponatremia    Hypoproteinemia (HCC)    C. difficile colitis    Infectious diarrhea    Leukocytosis    Generalized abdominal pain    Abnormal CT of the abdomen       Service Assessment  Patient Orientation Alert and Oriented   Cognition Alert   History Provided By Patient   Primary Caregiver Self   Accompanied By/Relationship     Support Systems Family Members   Patient's Healthcare Decision Maker is:     PCP Verified by CM Yes Alan Villeda)   Last Visit to PCP     Prior Functional Level Independent in ADLs/IADLs   Current Functional Level Independent in ADLs/IADLs   Can patient return to prior living arrangement Yes   Ability to make needs known: Good   Family able to assist with home care needs: Yes   Would you like for me to discuss the discharge plan with any other family members/significant others, and if so, who?      Financial Resources     Community Resources     CM/SW Referral       Social/Functional History  Lives With Alone   Type of Home Apartment   Home Layout One level   Home Access Level entry   Entrance Stairs - Number of Steps     Entrance Stairs - Rails     Bathroom Shower/Tub Tub/Shower unit   Bathroom Toilet     Bathroom Equipment     Bathroom Accessibility     Home Equipment     Receives Help From     ADL Assistance [employer] [facility] [employer] Work     Driving     Shopping          Other (Comment)     Homemaking Responsibilities     Meal Prep Responsibility     Laundry Responsibility     Cleaning Responsibility     Bill Paying/Finance Responsibility     [address] Management     Other (Comment)     Ambulation Assistance     Transfer Assistance     Active      Patient's  Info     Mode of Transportation     Education     Occupation     Type of Occupation       Discharge Planning   Type of [address] [facility] Family Members   Current Services Prior To Admission     Potential Assistance Needed     DME     DME     DME Ordered? Potential Assistance Purchasing Medications     Meds-to-Beds: Does the patient want to have any new prescriptions delivered to bedside prior to discharge? Type of Home Care Services     Patient expects to be discharged to: Follow Up Appointment: Best Day/Time     One/Two Story Residence:     # of Interior Steps     Height of Each Step (in)     [employer] Available     History of Falls? Services At/After Discharge  Transition of Care Consult (CM Consult): Internal Home Health     Internal Hospice     Reason Outside Agency [address]     Partner SNF     Reason Why Partner SNF Not Chosen     Internal Comfort Care     Reason Outside [address] Discharge     [address] Provided? Mode of Transport at [facility] Time of Discharge     Confirm Follow Up Transport       Condition of Participation: Discharge Planning  The plan for Transition of Care is related to the following treatment goals: The Patient and/or Patient Representative was provided with a Choice of Provider?      Name of the Patient Representative who was provided with the Choice of Provider and agrees with the Discharge Plan? The Patient and/or Patient Representative Agree with the Discharge Plan? Freedom of Choice list was provided with basic dialogue that supports the individualized plan of care/goals, treatment preferences, and shares the quality data associated with the providers? Documentation for Discharge Appeal  Discharge Appealed by     Date notified by QIO of appeal request:     Time notified by QIO of appeal request:     Detailed Notice of Discharge given to:     Date Notice of Discharge given:     Time Notice of Discharge given:     Date records sent to [address]     Time records sent to [address]     Date Notified of Outcome     Time Notified of Outcome     Outcome of appeal         SW met with patient (family present in room) to complete initial assessment. Demographics on face sheet confirmed. PCP is Airam Ortez. Prior to hospitalization, patient was independent with ADLs and required no DME. Patient without a history of home health involvement. However, patient is receptive to receiving home health services at discharge. List of home health agencies provided. Per patient, she will review list and follow-up with  with agency preference. JUANITA will continue to follow for discharge needs.     ITA Garcia 08/12/22 4:01 PM

## 2022-08-12 NOTE — PROGRESS NOTES
PHYSICAL THERAPY Daily Note and AM  (Link to Caseload Tracking: PT Visit Days : 2  Acknowledge Orders  Time In/Out  PT Charge Capture  Rehab Caseload Tracker    Maria Guadalupe Briones is a 77 y.o. female   PRIMARY DIAGNOSIS: Sepsis without septic shock ([facility]  Colitis [K52.9]  MILLA (acute kidney injury) ([facility] [N17.9]  Leukocytosis, unspecified type [D72.829]  Acute appendicitis with generalized peritonitis, unspecified whether abscess present, unspecified whether gangrene present, unspecified whether perforation present [K35.20]  Sepsis without septic shock ([facility] [A41.9]       Reason for Referral: Generalized Muscle Weakness (M62.81)  Difficulty in walking, Not elsewhere classified (R26.2)  Inpatient: Payor: Jules Pinedo / Plan: Yael Rees / Product Type: *No Product type* /     ASSESSMENT:     REHAB RECOMMENDATIONS:   Recommendation to date pending progress:  [address] [place] [address] but may not need any follow up pending progress    Equipment:    To Be Determined     ASSESSMENT:  Ms. Henna Jones admitted with above diagnosis. She has had nausea, vomiting and diarrhea for last 2 weeks with abdominal pain. She is weak from not feeling well and is currently NPO  but states she is hungry. She presents with generalized weakness and decreased mobility. She will benefit from skilled PT interventions to maximize independence with mobility and strengthening. : Pt tolerated tx well with improved activity tolerance today, ambulating 300' with RW and SBA. She was also able to perform seated/standing therex with supervision. Will continue to plan for HHPT.      [address] [facility] [address] [address] [employer] 6 Clicks Basic Mobility Inpatient Short Form  AM-PAC Mobility Inpatient   How much difficulty turning over in bed?: A Little  How much difficulty sitting down on / standing up from a chair with arms?: A Little  How much difficulty moving from lying on back to sitting on side of bed?: A Little  How much help from another person moving to and from a bed to a chair?: A Little  How much help from another person needed to walk in hospital room?: A Little  How much help from another person for climbing 3-5 steps with a railing?: A Little  AM-PAC Inpatient Mobility Raw Score : 18  AM-PAC Inpatient T-Scale Score : 43.63  Mobility Inpatient CMS 0-100% Score: 46.58  Mobility Inpatient CMS G-Code Modifier : CK    SUBJECTIVE:   Ms. Henna Jones states, \"I am so weak and I am hungry\"     Social/Functional Lives With: Alone  Type of Home: Apartment  Home Layout: One level  Home Access: Level entry  Bathroom Shower/Tub: Tub/Shower unit    OBJECTIVE:     PAIN: Arvil Octave / O2: Tian Jorge / Delon Hunter / Hanna Grand:   Pre Treatment: having abdominal pain, did not rate         Post Treatment: abdominal pain, RN notified Vitals        Oxygen      IV    RESTRICTIONS/PRECAUTIONS:                    GROSS EVALUATION: Intact Impaired (Comments):   AROM []  Generally decreased-generalized weakness   PROM []    Strength []  Generally decreased-generalized weakness   Balance []     Posture [] Forward Head  Rounded Shoulders  Trunk Flexion leaned over due to abdominal pain   Sensation [x]     Coordination [x]      Tone [x]     Edema []    Activity Tolerance []      []      COGNITION/  PERCEPTION: Intact Impaired (Comments):   Orientation [x]     Vision [x]     Hearing [x]     Cognition  [x]       MOBILITY: I Mod I S SBA CGA Min Mod Max Total  NT x2 Comments:   Bed Mobility    Rolling [] [] [] [x] [] [] [] [] [] [] []    Supine to Sit [] [] [] [x] [] [] [] [] [] [] []    Scooting [] [] [] [x] [] [] [] [] [] [] []    Sit to Supine [] [] [] [] [] [] [] [] [] [x] []    Transfers    Sit to Stand [] [] [] [x] [] [] [] [] [] [] []    Bed to Chair [] [] [] [x] [] [] [] [] [] [] []    Stand to Sit [] [] [] [x] [] [] [] [] [] [] []     [] [] [] [] [] [] [] [] [] [] []    I=Independent, Mod I=Modified Independent, S=Supervision, SBA=Standby Assistance, CGA=Contact Guard Assistance, Min=Minimal Assistance, Mod=Moderate Assistance, Max=Maximal Assistance, Total=Total Assistance, NT=Not Tested    GAIT: I Mod I S SBA CGA Min Mod Max Total  NT x2 Comments:   Level of Assistance [] [] [] [x] [] [] [] [] [] [] []    Distance   feet    DME Rolling Walker    Gait Quality Antalgic, Decreased jonna , Decreased step clearance, and Decreased step length    Weightbearing Status      Stairs      I=Independent, Mod I=Modified Independent, S=Supervision, SBA=Standby Assistance, CGA=Contact Guard Assistance,   Min=Minimal Assistance, Mod=Moderate Assistance, Max=Maximal Assistance, Total=Total Assistance, NT=Not Tested    PLAN:   ACUTE PHYSICAL THERAPY GOALS:   (Developed with and agreed upon by patient and/or caregiver.)  STG:  (1.)Ms. Gavin Mo will move from supine to sit and sit to supine  with SUPERVISION within 4-7 treatment day(s). (2.)Ms. Gavin Mo will transfer from bed to chair and chair to bed with SUPERVISION using the least restrictive device within 4-7 treatment day(s). (3.)Ms. Gavin Mo will ambulate with STAND BY ASSIST for 250 feet with the least restrictive device within 4-7 treatment day(s). ________________________________________________________________________________________________      FREQUENCY AND DURATION: Daily for duration of hospital stay or until stated goals are met, whichever comes first.    THERAPY PROGNOSIS: Good    PROBLEM LIST:   (Skilled intervention is medically necessary to address:)  Decreased ADL/Functional Activities  Decreased Activity Tolerance  Decreased Gait Ability  Decreased Strength  Decreased Transfer Abilities INTERVENTIONS PLANNED:   (Benefits and precautions of physical therapy have been discussed with the patient.)  Therapeutic Activity  Therapeutic Exercise/HEP  Gait Training  Education       TREATMENT:       TREATMENT:   Therapeutic Activity (24 Minutes):  Therapeutic activity included Supine to Sit, Sit to Supine, Scooting, Ambulation on level ground, Sitting balance , Standing balance, and education and HTP to improve functional Activity tolerance, Mobility, Strength, and safety.     TREATMENT GRID:   Date:  8/12 Date:   Date:     Activity/Exercise Parameters Parameters Parameters   Sit to stands without UEs 10     LAQ 10     Ankle pumps 10     Quad sets 10     Standing heel raises 10                     AFTER TREATMENT PRECAUTIONS: Bed/Chair Locked, Call light within reach, Chair, Heels floated, RN notified, and Visitors at bedside    INTERDISCIPLINARY COLLABORATION:  RN/ PCT and OT/ SAUCEDA    EDUCATION:      TIME IN/OUT:  Time In: 1025  Time Out: Kendal 38  Minutes: [address], PT

## 2022-08-12 NOTE — PROGRESS NOTES
H&P/Consult Note/Progress Note/Office Note:   Codi Rangel  MRN: [id number]  :1955  Age:66 y.o.    HPI: Codi Rangel is a 77 y.o. female with history of hypertension, hyperlipidemia, and diabetes,   who was admitted by the hospitalist from the ER on 8/10/22 after she presented with severe, constant, and intractable abdominal pain. Nothing made pain better or worse  The abd pain was associated with copious diarrhea and she reported loose watery stools 4-6 times a day leading up to admission. She reported the diarrhea began about 4 wks ago. This started after she took about 5 weeks of PO Abx related to an infected dental extraction. She had 1 week of Clindamycin, followed by 2 wks of amoxicillin, followed by 2 wks of Pen-VK. Her abd pain and diarrhea have worsened over that period of time and she has developed associated nausea and vomiting. She was seen in the ER 2 weeks previously with nausea and vomiting. On 8/10/22 she was found to have leukocytosis and sepsis without shock. Her heart rate was 106. She was treated for empirically for infectious diarrhea with metronidazole and also given cefepime initially    Stool cultures were ordered. Stool C Diff toxin assay was + for C Diff infection  Blood cultures x2 were obtained. She was hydrated for creatinine of 1.1. CT imaging was performed as shown below    General surgery was consulted by Dr. Crys Mcneil at 6:24 PM on 8/10/22 but general surgery was not notified until a.m. of 22.      8/10/22 CT abd/pelvis with IV but no oral contrast  CT ABDOMEN: There is no hepatic or splenic lesion. The pancreas and adrenal glands unremarkable. The kidneys enhance symmetrically without discrete abnormality. There is a small amount of ascites within the right abdomen. There is diffuse relatively extensive colonic wall thickening, particularly of the ascending colon. There is no small bowel dilatation.  There are few mesenteric lymph nodes one of which measures up to 17 mm in short axis within the lower mid abdomen. There is a fluid filled tubular structure extending superiorly from the cecum. This could represent a dilated appendix measuring up to 16 mm in diameter although there are no surrounding inflammatory changes. CT PELVIS: Continued wall thickening of the colon is present to the level of the rectum. The pelvic structures are unremarkable. No aggressive osseous lesion is present. IMPRESSION:  1. Diffuse colonic wall thickening suggests an infectious/inflammatory colitis, including C. difficile. 2. Small amount of ascites. 3. Blind-ending fluid-filled tubular structure could represent a dilated appendix without surrounding inflammatory change. There could conceivably be obstruction of the appendiceal orifice due to the extensive mucosal thickening. Secondary acute appendicitis is not excluded. 4. Prominent mesenteric lymph nodes within the lower abdomen, most likely reactive.          Additional hx:  8/12/22 abd pain persists but is improved; WBC lower; she vomited her 500 mg dose of vancomycin; try 125 mg 4 times daily vancomycin p.o. to new IV Flagyl for now          Past Medical History:   Diagnosis Date    Diabetes mellitus ([facility] [address] 2014    not on medications hGB a1C 6.5    Endometriosis     total hysterectomy w/o BSO    Hyperlipidemia     Hypertension     not taking medication    Menopause      Past Surgical History:   Procedure Laterality Date    BREAST BIOPSY      BREAST SURGERY      benign lump    HYSTERECTOMY (CERVIX STATUS UNKNOWN)  1984    Total for endometriosis    HYSTERECTOMY, TOTAL ABDOMINAL (CERVIX REMOVED)  1983    ovaries intact     Current Facility-Administered Medications   Medication Dose Route Frequency    vancomycin (VANCOCIN) oral solution 125 mg  125 mg Oral 4 times per day    dextrose 5 % and 0.45 % NaCl with KCl 20 mEq infusion   IntraVENous Continuous    saccharomyces boulardii (FLORASTOR) capsule 250 mg  250 mg Oral BID    metronidazole (FLAGYL) 500 mg in 0.9% NaCl 100 mL IVPB premix  500 mg IntraVENous Q6H    sodium chloride flush 0.9 % injection 5-40 mL  5-40 mL IntraVENous PRN    ondansetron (ZOFRAN-ODT) disintegrating tablet 4 mg  4 mg Oral Q8H PRN    Or    ondansetron (ZOFRAN) injection 4 mg  4 mg IntraVENous Q6H PRN    acetaminophen (TYLENOL) tablet 650 mg  650 mg Oral Q6H PRN    potassium chloride (KLOR-CON M) extended release tablet 40 mEq  40 mEq Oral PRN    Or    potassium bicarb-citric acid (EFFER-K) effervescent tablet 40 mEq  40 mEq Oral PRN    Or    potassium chloride 10 mEq/100 mL IVPB (Peripheral Line)  10 mEq IntraVENous PRN    magnesium sulfate 2000 mg in 50 mL IVPB premix  2,000 mg IntraVENous PRN    sodium phosphate 10 mmol in sodium chloride 0.9 % 250 mL IVPB  10 mmol IntraVENous PRN    Or    sodium phosphate 15 mmol in sodium chloride 0.9 % 250 mL IVPB  15 mmol IntraVENous PRN    Or    sodium phosphate 20 mmol in sodium chloride 0.9 % 500 mL IVPB  20 mmol IntraVENous PRN    morphine (PF) injection 2 mg  2 mg IntraVENous Q4H PRN     Facility-Administered Medications Ordered in Other Encounters   Medication Dose Route Frequency    sodium chloride flush 0.9 % injection 10 mL  10 mL IntraVENous ONCE PRN     ALLERGIES:  Patient has no known allergies.     Social History     Socioeconomic History    Marital status: Single     Spouse name: None    Number of children: None    Years of education: None    Highest education level: None   Tobacco Use    Smoking status: Never    Smokeless tobacco: Never   Substance and Sexual Activity    Alcohol use: No    Drug use: No     Social History     Tobacco Use   Smoking Status Never   Smokeless Tobacco Never     Family History   Problem Relation Age of Onset    Colon Cancer Neg Hx     Ovarian Cancer Neg Hx     Breast Cancer Neg Hx     No Known Problems Brother     No Known Problems Brother     No Known Problems Sister     No Known Problems Father Osteoarthritis Mother     Uterine Cancer Neg Hx      ROS: The patient has no difficulty with chest pain or shortness of breath. No fever or chills. Comprehensive review of systems was otherwise unremarkable except as noted above. Physical Exam:   BP (!) 142/71   Pulse 97   Temp 98.1 °F (36.7 °C) (Oral)   Resp 16   Ht 4' 11\" (1.499 m)   Wt 138 lb 6.4 oz (62.8 kg)   SpO2 97%   BMI 27.95 kg/m²   Vitals:    08/11/22 1506 08/11/22 2035 08/11/22 2231 08/12/22 0417   BP: (!) 140/74 127/67 137/79 (!) 142/71   Pulse: 100 99 97 97   Resp: 18 16 17 16   Temp: 98.1 °F (36.7 °C) 97.5 °F (36.4 °C) 97.5 °F (36.4 °C) 98.1 °F (36.7 °C)   TempSrc:  Oral Oral Oral   SpO2: 97% 99% 97% 97%   Weight:       Height:         No intake/output data recorded. 08/10 1901 - 08/12 0700  In: -   Out: 200 [Urine:200]    Constitutional: Alert, oriented, cooperative patient;  appears stated age    Eyes:Sclera are clear. EOMs intact  ENMT: no external lesions gross hearing normal; no obvious neck masses, no ear or lip lesions, nares normal  CV: RRR. Normal perfusion  Resp: No JVD. Breathing is  non-labored; no audible wheezing. GI: Moderate distention with diffuse tenderness which is improving      Musculoskeletal: unremarkable with normal function. No embolic signs or cyanosis.    Neuro:  Oriented; moves all 4; no focal deficits  Psychiatric: normal affect and mood, no memory impairment    Recent vitals (if inpt):  Patient Vitals for the past 24 hrs:   BP Temp Temp src Pulse Resp SpO2 Height Weight   08/12/22 0417 (!) 142/71 98.1 °F (36.7 °C) Oral 97 16 97 % -- --   08/11/22 2231 137/79 97.5 °F (36.4 °C) Oral 97 17 97 % -- --   08/11/22 2035 127/67 97.5 °F (36.4 °C) Oral 99 16 99 % -- --   08/11/22 1506 (!) 140/74 98.1 °F (36.7 °C) -- 100 18 97 % -- --   08/11/22 1139 127/63 97.7 °F (36.5 °C) Oral (!) 102 -- 97 % -- --   08/11/22 0951 -- -- -- -- -- -- 4' 11\" (1.499 m) --   08/11/22 0945 -- -- -- -- -- -- -- 138 lb 6.4 oz (62.8 kg) Amount and/or Complexity of Data Reviewed and Analyzed:  I reviewed and analyzed all of the unique labs and radiologic studies that are shown below as well as any that are in the HPI, and any that are in the expanded problem list below  *Each unique test, order, or document contributes to the combination of 2 or combination of 3 in Category 1 below. For this visit I also reviewed old records and prior notes. Recent Labs     08/12/22  0416   WBC 18.3*   HGB 12.0   *      K 3.0*      CO2 28   BUN 13   ALT 10*     Review of most recent CBC  Lab Results   Component Value Date    WBC 18.3 (H) 08/12/2022    HGB 12.0 08/12/2022    HCT 37.1 08/12/2022    MCV 81.7 08/12/2022     (H) 08/12/2022       Review of most recent BMP  Lab Results   Component Value Date/Time     08/12/2022 04:16 AM    K 3.0 08/12/2022 04:16 AM     08/12/2022 04:16 AM    CO2 28 08/12/2022 04:16 AM    BUN 13 08/12/2022 04:16 AM    CREATININE 0.53 08/12/2022 04:16 AM    GLUCOSE 102 08/12/2022 04:16 AM    CALCIUM 8.9 08/12/2022 04:16 AM        Review of most recent LFTs (and lipase if done)  Lab Results   Component Value Date    ALT 10 (L) 08/12/2022    AST 11 (L) 08/12/2022    ALKPHOS 65 08/12/2022    BILITOT 0.4 08/12/2022     Lab Results   Component Value Date    LIPASE 31 (L) 08/10/2022       No results found for: INR, APTT, LCAD    Review of most recent HgbA1c  No results found for: LABA1C  No results found for: EAG    Nutritional assessment screen for wound healing issues:  Lab Results   Component Value Date    LABALBU 1.8 (L) 08/12/2022       @lastcovr@    Xray Result (most recent):  XR CHEST PORTABLE 08/10/2022    Narrative  EXAM: XR CHEST PORTABLE  INDICATION: sepsis  COMPARISON: Chest radiograph, 1/16/2016    FINDINGS:  The cardiomediastinal silhouette is within normal limits. No focal parenchymal  process. No pleural effusion. No pneumothorax. No acute osseous abnormality.     Impression  No acute cardiopulmonary abnormality. CT Result (most recent):  CT ABDOMEN PELVIS W IV CONTRAST 08/10/2022    Narrative  CT ABDOMEN AND PELVIS WITH CONTRAST    HISTORY:  Abdominal pain and vomiting x2 weeks. Infrequent bowel movements. TECHNIQUE: Helically acquired images were obtained from the domes of the  diaphragms to the ischial tuberosities reconstructed at 5mm intervals after the  uneventful administration of 100c's of intravenous Isovue-370 in order to better  evaluate the solid abdominal viscera and vascular structures. Oral contrast was  not administered per the emergency imaging BMI protocol. Coronal and sagittal  reformatted images were submitted. Radiation dose reduction techniques were used for this study:  Our CT scanners  use one or all of the following: Automated exposure control, adjustment of the  mA and/or kVp according to patient's size, iterative reconstruction. COMPARISON: None. Correlation is made to the abdominal series 07/25/2022. CT ABDOMEN: There is no hepatic or splenic lesion. The pancreas and adrenal  glands unremarkable. The kidneys enhance symmetrically without discrete  abnormality. There is a small amount of ascites within the right abdomen. There is diffuse  relatively extensive colonic wall thickening, particularly of the ascending  colon. There is no small bowel dilatation. There are few mesenteric lymph nodes  one of which measures up to 17 mm in short axis within the lower mid abdomen. There is a fluid filled tubular structure extending superiorly from the cecum. This could represent a dilated appendix measuring up to 16 mm in diameter  although there are no surrounding inflammatory changes. CT PELVIS: Continued wall thickening of the colon is present to the level of the  rectum. The pelvic structures are unremarkable. No aggressive osseous lesion is  present. Impression  1.  Diffuse colonic wall thickening suggests an infectious/inflammatory colitis,  including C. difficile. 2. Small amount of ascites. 3. Blind-ending fluid-filled tubular structure could represent a dilated  appendix without surrounding inflammatory change. There could conceivably be  obstruction of the appendiceal orifice due to the extensive mucosal thickening. Secondary acute appendicitis is not excluded  4. Prominent mesenteric lymph nodes within the lower abdomen, most likely  reactive. US Result (most recent):   BREAST LIMITED 03/11/2021    Narrative  This is a summary report. The complete report is available in the patient's medical record. If you cannot access the medical record, please contact the sending organization for a detailed fax or copy. BILATERAL DIAGNOSTIC DIGITAL MAMMOGRAPHY WITH TOMOSYNTHESIS,  BILATERAL BREAST SONOGRAPHY:    CLINICAL HISTORY:  72year-old status post prior benign left surgical biopsy  presents for evaluation of intermittent pain laterally in the left breast for  approximately 6 months. COMPARISON:  The patient reports prior outside mammograms, but none could be  located for comparison at this time. BILATERAL MAMMOGRAM:  No palpable lump was reported for placement of a skin  marker at the time of examination. Bilateral craniocaudal and mediolateral  oblique views with digital tomography as well as a left lateral view demonstrate  scattered fibroglandular tissue bilaterally. There are a few scattered  typically benign bilateral calcifications. There is a very small nodule  centrally at the right 10:00 position adjacent to a blood vessel.   No other  discrete soft tissue mass, cluster of suspicious microcalcifications, or other  evidence of malignancy is seen in either breast.    BILATERAL ULTRASOUND:  Images from careful ultrasound evaluation demonstrate a 3  mm lymph node with distinct fatty and vascular hilum centrally at the right  10:00 position 9 cm from the nipple corresponding well in size, configuration,  and location with the mammographic nodule. Images from the painful lateral left  breast demonstrate no discrete cystic or solid mass, and no abnormal acoustical  shadowing suspicious for malignancy is seen. Nevertheless, it should be noted  that mammography and ultrasound fail to detect a small percentage of carcinoma,  and therefore any area of persistent painful concern must be managed clinically. Impression  INCIDENTAL RIGHT CENTRAL 10:00 SMALL INTRAMAMMARY LYMPH NODE WITH NO DEFINITE  EVIDENCE OF MALIGNANCY IN EITHER BREAST. FOLLOW-UP BILATERAL SCREENING  MAMMOGRAPHY IS RECOMMENDED IN ONE YEAR. BI-RADS Assessment Category 2: Benign finding. BD2      Admission date (for inpatients): 8/10/2022   * No surgery found *  * No surgery found *        ASSESSMENT/PLAN:  [unfilled]  Principal Problem:    Sepsis without septic shock ([address]  Active Problems:    Disorder of fluid or electrolyte    Hyponatremia    Hypoproteinemia (HCC)    C. difficile colitis    Infectious diarrhea    Leukocytosis    Generalized abdominal pain    Abnormal CT of the abdomen    Hypertension    Diabetes mellitus ([address]    Hyperlipidemia  Resolved Problems:    * No resolved hospital problems.  *     Patient Active Problem List    Diagnosis Date Noted    Hypoproteinemia ([address] 08/11/2022     Priority: Medium    C. difficile colitis 08/11/2022     Priority: Medium    Infectious diarrhea 08/11/2022     Priority: Medium    Leukocytosis 08/11/2022     Priority: Medium    Generalized abdominal pain 08/11/2022     Priority: Medium    Abnormal CT of the abdomen 08/11/2022     Priority: Medium    Sepsis without septic shock ([address] 08/10/2022     Priority: Medium    Disorder of fluid or electrolyte 08/10/2022     Priority: Medium    Hyponatremia 08/10/2022     Priority: Medium    Hypertension      not taking medication        Diabetes mellitus ([address]     Hyperlipidemia           Number and Complexity of Problems addressed and   Risks of complications and/or morbidity of management      She has abdominal pain, diarrhea, sepsis without hypotension, tachycardia, leukocytosis, and CT evidence for diffuse colonic thickening and an abnormal appendix which is likely secondary. Her clinical history and her signs and symptoms are consistent with possible C. difficile colitis. Stool studies --->+C Diff toxin  Continue inpt admission    Continue bowel rest until abd is pain free to deep palpation without pain meds  PICC and TPN on Monday if not there yet by Monday    NPO except meds  LR hydration --->D5 1/2NS + 20KCL at 130cc/hr for now  Replace lytes prn  Cortez to follow urine output  IV Flagyl 500 mg q6hrs for now as her ileus may preclude adequate delivery of p.o. vancomycin to the distal GI tract initially. She vomited Vancomycin 500 mg PO qid--->Try Vancomycin 125mg QID  If that fails --->Dificid 100 mg BID     Continue Florastor  Await blood cultures  Admission lactic acid was normal    I discussed with the patient and family that sometimes symptoms progressed to the point we have to proceed with emergent colectomy and ileostomy. We also discussed that it is unclear if she is developing acute appendicitis which could need treatment as well or whether the appendix is just secondarily involved. I discussed with patient and family the life-threatening nature of her current illness. Her pain is diffuse in its location and not localized to RLQ/appendix    Surgery will see her daily  Emergent colectomy and ileostomy if needed  Move to ICU if needed              Level of MDM (2/3 elements below)  Number and Complexity of Problems Addressed Amount and/or Complexity of Data to be Reviewed and Analyzed  *Each unique test, order, or document contributes to the combination of 2 or combination of 3 in Category 1 below.  Risk of Complications and/or Morbidity or Mortality of pt Management     [id number] SF Minimal  ?1self-limited or minor problem Minimal or none Minimal risk of morbidity from additional diagnostic testing or Rx   [id number]  [id number] Low Low  ? 2or more self-limited or minor problems;    or  ? 1stable chronic illness;    or  ?1acute, uncomplicated illness or injury   Limited  (Must meet the requirements of at least 1 of the 2 categories)  Category 1: Tests and documents   ? Any combination of 2 from the following:  ?Review of prior external note(s) from each unique source*;  ?review of the result(s) of each unique test*;   ?ordering of each unique test*    or   Category 2: Assessment requiring an independent historian(s)  (For the categories of independent interpretation of tests and discussion of management or test interpretation, see moderate or high) Low risk of morbidity from additional diagnostic testing or treatment     86325  59777 Mod Moderate  ? 1or more chronic illnesses with exacerbation, progression, or side effects of treatment;    or  ?2or more stable chronic illnesses;    or  ?1undiagnosed new problem with uncertain prognosis;    or  ?1acute illness with systemic symptoms;    or  ?1acute complicated injury   Moderate  (Must meet the requirements of at least 1 out of 3 categories)  Category 1: Tests, documents, or independent historian(s)  ? Any combination of 3 from the following:   ?Review of prior external note(s) from each unique source*;  ?Review of the result(s) of each unique test*;  ?Ordering of each unique test*;  ?Assessment requiring an independent historian(s)    or  Category 2: Independent interpretation of tests   ? Independent interpretation of a test performed by another physician/other qualified health care professional (not separately reported);     or  Category 3: Discussion of management or test interpretation  ? Discussion of management or test interpretation with external physician/other qualified health care professional/appropriate source (not separately reported)   Moderate risk of morbidity from additional diagnostic testing or treatment  Examples only:  ?Prescription drug management   ? Decision regarding minor surgery with identified patient or procedure risk factors  ? Decision regarding elective major surgery without identified patient or procedure risk factors   ? Diagnosis or treatment significantly limited by social determinants of health       54389  64500 High High  ? 1or more chronic illnesses with severe exacerbation, progression, or side effects of treatment;    or  ?1 acute or chronic illness or injury that poses a threat to life or bodily function   Extensive  (Must meet the requirements of at least 2 out of 3 categories)  Category 1: Tests, documents, or independent historian(s)  ? Any combination of 3 from the following:   ?Review of prior external note(s) from each unique source*;  ?Review of the result(s) of each unique test*;   ?Ordering of each unique test*;   ?Assessment requiring an independent historian(s)    or   Category 2: Independent interpretation of tests   ? Independent interpretation of a test performed by another physician/other qualified health care professional (not separately reported);     or  Category 3: Discussion of management or test interpretation  ? Discussion of management or test interpretation with external physician/other qualified health care professional/appropriate source (not separately reported)   High risk of morbidity from additional diagnostic testing or treatment  Examples only:  ?Drug therapy requiring intensive monitoring for toxicity  ? Decision regarding elective major surgery with identified patient or procedure risk factors  ? Decision regarding emergency major surgery  ? Decision regarding hospitalization  ? Decision not to resuscitate or to de-escalate care because of poor prognosis             I have personally performed a face-to-face diagnostic evaluation and management  service on this patient. I have independently seen the patient.    I have independently obtained the above history from the

## 2022-08-12 NOTE — PROGRESS NOTES
Patient has sepsis score of 8 perfect serve sent to [facility] Conklin to make him aware. The patient has no acute issues related. She accepted po meds well and is now on clear liquid diet and tolerating well. Family at bedside will monitor for changes.

## 2022-08-12 NOTE — PLAN OF CARE
Problem: Discharge Planning  Goal: Discharge to home or other facility with appropriate resources  8/12/2022 1608 by Katrin Sosa RN  Outcome: Progressing  8/12/2022 0616 by Candice Herr RN  Outcome: Progressing  Flowsheets (Taken 8/11/2022 2000)  Discharge to home or other facility with appropriate resources:   Identify barriers to discharge with patient and caregiver   Arrange for needed discharge resources and transportation as appropriate   Identify discharge learning needs (meds, wound care, etc)   Refer to discharge planning if patient needs post-hospital services based on physician order or complex needs related to functional status, cognitive ability or social support system     Problem: Pain  Goal: Verbalizes/displays adequate comfort level or baseline comfort level  8/12/2022 1608 by Katrin Sosa RN  Outcome: Progressing  8/12/2022 0616 by Candice Herr RN  Outcome: Progressing     Problem: Chronic Conditions and Co-morbidities  Goal: Patient's chronic conditions and co-morbidity symptoms are monitored and maintained or improved  8/12/2022 1608 by Katrin Sosa RN  Outcome: Progressing  8/12/2022 0616 by Candice Herr RN  Outcome: Progressing  Flowsheets (Taken 8/11/2022 2000)  Care Plan - Patient's Chronic Conditions and Co-Morbidity Symptoms are Monitored and Maintained or Improved: Monitor and assess patient's chronic conditions and comorbid symptoms for stability, deterioration, or improvement

## 2022-08-12 NOTE — CARE COORDINATION
ASSESSMENT NOTE    Attending Physician: Oksana Presley MD  Admit Problem: Colitis [K52.9]  MILLA (acute kidney injury) ([facility] [N17.9]  Leukocytosis, unspecified type [D72.829]  Acute appendicitis with generalized peritonitis, unspecified whether abscess present, unspecified whether gangrene present, unspecified whether perforation present [K35.20]  Sepsis without septic shock ([facility] [A41.9]  Date/Time of Admission: 8/10/2022 12:11 PM  Problem List:  Patient Active Problem List   Diagnosis    Hypertension    Diabetes mellitus ([facility]    Hyperlipidemia    Hypoproteinemia ([facility]    Moderate protein-calorie malnutrition ([facility]    History of Clostridium difficile colitis       Service Assessment  Patient Orientation Alert and Oriented   Cognition Alert   History Provided By Patient   Primary Caregiver Self   Accompanied By/Relationship     Support Systems Family Members   Patient's Healthcare Decision Maker is:     PCP Verified by CM Yes Minh Suggs)   Last Visit to PCP     Prior Functional Level Independent in ADLs/IADLs   Current Functional Level Independent in ADLs/IADLs   Can patient return to prior living arrangement Yes   Ability to make needs known: Good   Family able to assist with home care needs: Yes   Would you like for me to discuss the discharge plan with any other family members/significant others, and if so, who?      Financial Resources     Community Resources     CM/SW Referral       Social/Functional History  Lives With Alone   Type of Home Apartment   Home Layout One level   Home Access Level entry   Entrance Stairs - Number of Steps     Entrance Stairs - Rails     Bathroom Shower/Tub Tub/Shower unit   Bathroom Toilet     Bathroom Equipment     Bathroom Accessibility     Home Equipment     Receives Help From     [address]     Grooming     Feeding     Toileting     [address]     Yard Work Driving     Shopping          Other (Comment)     [address] Paying/Finance Responsibility     Shopping Responsibility     Dependent Care Responsibility     Health Care Management     Other (Comment)     Ambulation Assistance     Transfer Assistance     Active      Patient's  Info     Mode of Transportation     Education     Occupation     Type of Occupation       Discharge Planning   Type of [facility] Family Members   Support Systems Family Members   Current Services Prior To Admission None   [address]   DME     DME     DME Ordered? No   Potential Assistance Purchasing Medications     Meds-to-Beds: Does the patient want to have any new prescriptions delivered to bedside prior to discharge? Type of Home Care Services None   Patient expects to be discharged to: House   Follow Up Appointment: Best Day/Time     One/Two Story Residence:     # of Interior Steps     Height of Each Step (in)     [employer] Available     History of Falls? No     Services At/After Discharge  Transition of Care Consult (CM Consult): Internal Home Health     Internal Hospice     Reason Outside Agency [address]     Partner SNF     Reason Why Partner SNF Not Chosen     Internal Comfort Care     Reason Outside [address] Discharge     [address] Provided? Mode of Transport at [facility] Time of Discharge     Confirm Follow Up Transport       Condition of Participation: Discharge Planning  The plan for Transition of Care is related to the following treatment goals: The Patient and/or Patient Representative was provided with a Choice of Provider?      Name of the Patient Representative who was provided with the Choice of Provider and agrees with the Discharge Plan?     The Patient and/or Patient Representative Agree with the Discharge Plan? Freedom of Choice list was provided with basic dialogue that supports the individualized plan of care/goals, treatment preferences, and shares the quality data associated with the providers?        Documentation for Discharge Appeal  Discharge Appealed by     Date notified by [facility] of appeal request:     Time notified by [facility] of appeal request:     Detailed Notice of Discharge given to:     Date Notice of Discharge given:     Time Notice of Discharge given:     Date records sent to [facility]IO     Time records sent to  Mojgan Fernández     Date Notified of Outcome     Time Notified of Outcome     Outcome of appeal           ITA Enamorado 08/14/22 3:29 PM    Willie Haile, [address] [facility] Work   [address] [facility]

## 2022-08-12 NOTE — PROGRESS NOTES
Medical Student Progress Note   Admit Date:  8/10/2022 12:11 PM   Name:  Parisa Black   Age:  77 y.o. Sex:  female  :  1955   MRN:  [id number]     Presenting Complaint: Abdominal Pain    Reason(s) for Admission: Colitis [K52.9]  MILLA (acute kidney injury) ([facility] [N17.9]  Leukocytosis, unspecified type [D72.829]  Acute appendicitis with generalized peritonitis, unspecified whether abscess present, unspecified whether gangrene present, unspecified whether perforation present [K35.20]  Sepsis without septic shock [facility]) [A41.9]       Hospital Course & Interval History:       Subjective (22): Zachary Friend is a 73yo female with a history hypertension, hyperlipidemia, and diabetes who presented to the ED (8/10) with abdominal pain. Patient reports diarrhea twice last night and emesis after given liquid PO. Patient states she feels very weak with continued epigastric abdominal pain. Assessment & Plan:   Sepsis due to infectious diarrhea (C. diff)  WBC 18.3 from 23 on admission and HR is 107. Source is infectious diarrhea. Stool culture positive for C. Difficile. Placed on vancomycin. Cefepime discontinued.  -Continue vancomycin and metronidazole   -Seriel CBC    Acute Kidney Injury  Admission sCr 1.1. Today is 0.53.  -Recheck BMP  -Avoid nephrotoxic substances     Fluid or electrolyte disorder  Hypophosphatemia and hypokalemia  Potassium 3.0. Phosphorus 1.3.  -Replete and recheck     Hypertension  BP is stable off medications.  -Hold lisinopril and hydrochlorothiazide for potential MILLA. Diabetes mellitus  Admission serum glucose 126. Glucose today 102 without insulin.  -Insulin if needed    Hyperlipidemia  - Continue Rosuvastatin    Hypoproteinemia  Total protein 5.7  -Consult nutrition    Diet:  Diet NPO Exceptions are: Sips of Water with Meds, Ice Chips  DVT PPx: Enoxaparin  Code status: Full    Physical Exam  Constitutional:       Appearance: She is ill-appearing.    HENT:      Head: Normocephalic and atraumatic. Eyes:      Conjunctiva/sclera: Conjunctivae normal.   Cardiovascular:      Rate and Rhythm: Tachycardia present. Pulmonary:      Effort: Pulmonary effort is normal.      Breath sounds: Normal breath sounds. Abdominal:      Tenderness: There is abdominal tenderness. Neurological:      Mental Status: She is alert and oriented to person, place, and time. Active Hospital Problems    Diagnosis Date Noted    Hypoproteinemia ([facility] 08/11/2022     Priority: Medium    C. difficile colitis 08/11/2022     Priority: Medium    Infectious diarrhea 08/11/2022     Priority: Medium    Leukocytosis 08/11/2022     Priority: Medium    Generalized abdominal pain 08/11/2022     Priority: Medium    Abnormal CT of the abdomen 08/11/2022     Priority: Medium    Sepsis without septic shock ([facility] 08/10/2022     Priority: Medium    Disorder of fluid or electrolyte 08/10/2022     Priority: Medium    Hyponatremia 08/10/2022     Priority: Medium    Hypertension      not taking medication        Diabetes mellitus ([facility]     Hyperlipidemia      Objective:   Patient Vitals for the past 24 hrs:   Temp Pulse Resp BP SpO2   08/12/22 0417 98.1 °F (36.7 °C) 97 16 (!) 142/71 97 %   08/11/22 2231 97.5 °F (36.4 °C) 97 17 137/79 97 %   08/11/22 2035 97.5 °F (36.4 °C) 99 16 127/67 99 %   08/11/22 1506 98.1 °F (36.7 °C) 100 18 (!) 140/74 97 %   08/11/22 1139 97.7 °F (36.5 °C) (!) 102 -- 127/63 97 %       @BSHSIFLOW(2444:last)@    Estimated body mass index is 27.95 kg/m² as calculated from the following:    Height as of this encounter: 4' 11\" (1.499 m). Weight as of this encounter: 138 lb 6.4 oz (62.8 kg).   No intake or output data in the 24 hours ending 08/12/22 6599    I have reviewed ordered lab tests and independently visualized imaging below:    Last 24hr Labs:  Recent Results (from the past 24 hour(s))   Comprehensive Metabolic Panel w/ Reflex to MG    Collection Time: 08/12/22  4:16 AM   Result Value Ref Range    Sodium 139 136 - 145 mmol/L    Potassium 3.0 (L) 3.5 - 5.1 mmol/L    Chloride 103 98 - 107 mmol/L    CO2 28 21 - 32 mmol/L    Anion Gap 8 7 - 16 mmol/L    Glucose 102 (H) 65 - 100 mg/dL    BUN 13 8 - 23 MG/DL    Creatinine 0.53 (L) 0.6 - 1.0 MG/DL    GFR African American >60 >60 ml/min/1.73m2    GFR Non- >60 >60 ml/min/1.73m2    Calcium 8.9 8.3 - 10.4 MG/DL    Total Bilirubin 0.4 0.2 - 1.1 MG/DL    ALT 10 (L) 12 - 65 U/L    AST 11 (L) 15 - 37 U/L    Alk Phosphatase 65 50 - 130 U/L    Total Protein 5.7 (L) 6.3 - 8.2 g/dL    Albumin 1.8 (L) 3.2 - 4.6 g/dL    Globulin 3.9 (H) 2.3 - 3.5 g/dL    Albumin/Globulin Ratio 0.5 (L) 1.2 - 3.5     CBC with Auto Differential    Collection Time: 08/12/22  4:16 AM   Result Value Ref Range    WBC 18.3 (H) 4.3 - 11.1 K/uL    RBC 4.54 4.05 - 5.2 M/uL    Hemoglobin 12.0 11.7 - 15.4 g/dL    Hematocrit 37.1 35.8 - 46.3 %    MCV 81.7 79.6 - 97.8 FL    MCH 26.4 26.1 - 32.9 PG    MCHC 32.3 31.4 - 35.0 g/dL    RDW 14.5 11.9 - 14.6 %    Platelets 426 (H) 624 - 450 K/uL    MPV 8.8 (L) 9.4 - 12.3 FL    nRBC 0.00 0.0 - 0.2 K/uL    Differential Type AUTOMATED      Seg Neutrophils 72 43 - 78 %    Lymphocytes 8 (L) 13 - 44 %    Monocytes 17 (H) 4.0 - 12.0 %    Eosinophils % 0 (L) 0.5 - 7.8 %    Basophils 1 0.0 - 2.0 %    Immature Granulocytes 3 0.0 - 5.0 %    Segs Absolute 13.1 (H) 1.7 - 8.2 K/UL    Absolute Lymph # 1.5 0.5 - 4.6 K/UL    Absolute Mono # 3.1 (H) 0.1 - 1.3 K/UL    Absolute Eos # 0.1 0.0 - 0.8 K/UL    Basophils Absolute 0.1 0.0 - 0.2 K/UL    Absolute Immature Granulocyte 0.5 0.0 - 0.5 K/UL   Phosphorus    Collection Time: 08/12/22  4:16 AM   Result Value Ref Range    Phosphorus 1.3 (L) 2.3 - 3.7 MG/DL   Magnesium    Collection Time: 08/12/22  4:16 AM   Result Value Ref Range    Magnesium 2.1 1.8 - 2.4 mg/dL       [unfilled]    Other Studies:  [unfilled]    Current Meds:  Current Facility-Administered Medications   Medication Dose Route Frequency    vancomycin (VANCOCIN) oral solution 125 mg  125 mg Oral 4 times per day    dextrose 5 % and 0.45 % NaCl with KCl 20 mEq infusion   IntraVENous Continuous    saccharomyces boulardii (FLORASTOR) capsule 250 mg  250 mg Oral BID    metronidazole (FLAGYL) 500 mg in 0.9% NaCl 100 mL IVPB premix  500 mg IntraVENous Q6H    sodium chloride flush 0.9 % injection 5-40 mL  5-40 mL IntraVENous PRN    ondansetron (ZOFRAN-ODT) disintegrating tablet 4 mg  4 mg Oral Q8H PRN    Or    ondansetron (ZOFRAN) injection 4 mg  4 mg IntraVENous Q6H PRN    acetaminophen (TYLENOL) tablet 650 mg  650 mg Oral Q6H PRN    potassium chloride (KLOR-CON M) extended release tablet 40 mEq  40 mEq Oral PRN    Or    potassium bicarb-citric acid (EFFER-K) effervescent tablet 40 mEq  40 mEq Oral PRN    Or    potassium chloride 10 mEq/100 mL IVPB (Peripheral Line)  10 mEq IntraVENous PRN    magnesium sulfate 2000 mg in 50 mL IVPB premix  2,000 mg IntraVENous PRN    sodium phosphate 10 mmol in sodium chloride 0.9 % 250 mL IVPB  10 mmol IntraVENous PRN    Or    sodium phosphate 15 mmol in sodium chloride 0.9 % 250 mL IVPB  15 mmol IntraVENous PRN    Or    sodium phosphate 20 mmol in sodium chloride 0.9 % 500 mL IVPB  20 mmol IntraVENous PRN    morphine (PF) injection 2 mg  2 mg IntraVENous Q4H PRN     Facility-Administered Medications Ordered in Other Encounters   Medication Dose Route Frequency    sodium chloride flush 0.9 % injection 10 mL  10 mL IntraVENous ONCE PRN       Signed:  Javier Elias

## 2022-08-12 NOTE — PROGRESS NOTES
Hospitalist Progress Note   Admit Date:  8/10/2022 12:11 PM   Name:  Sinan Morning   Age:  77 y.o. Sex:  female  :  1955   MRN:  [id number]   Room:  [facility]/    Presenting Complaint: Abdominal Pain     Reason(s) for Admission: Colitis [K52.9]  MILLA (acute kidney injury) ([facility] [N17.9]  Leukocytosis, unspecified type [D72.829]  Acute appendicitis with generalized peritonitis, unspecified whether abscess present, unspecified whether gangrene present, unspecified whether perforation present [K35.20]  Sepsis without septic shock [facility]) [A41.9]     Hospital Course & Interval History:   77 y.o. female with medical history of hypertension, hyperlipidemia, diabetes who presented with intractable abdominal pain. She was previously seen in the emergency department 2 weeks prior with nausea and vomiting. She was started on a course of antibiotics without resolution of symptoms. Her nausea has gotten much worse she has painful abdomen with diarrhea. She goes to the bathroom approximately 4-6 times per days with loose watery stools. Subjective/24hr Events (22): Abdominal pain resolved. Patient requesting advance diet. Tolerated clears. Still numerous bowel movements, watery in character. Tolerating oral vancomycin. Assessment & Plan:   Sepsis without septic shock due to infectious diarrhea (C diff)  Admission WBC 23, , source infectious diarrhea; cefepime (8/10)  -Metronidazole (8/10-. .), vancomycin po (-. .)  -Diarrhea labs  -Follow cultures  2022: Continue antibiotics, advance diet as tolerated     Acute kidney injury  Admission sCr 1.1 from baseline ~0.8  -Avoid nephrotoxic substances  -Resuscitate LR @ 125  2022: sCr 0.53, resolved     Fluid or electrolyte disorder  2022: hypokalemia, replete, recheck     Hypertension  -Hold lisinopril, hydrochlorothiazide for MILLA  2022: BP stable off meds, continue to hold      Diabetes mellitus  Admission serum glucose 126  -Start insulin if needed  8/12/2022: glc 102, continue without insulin     Hyperlipidemia  -Rosuvastatin    Hypoproteinemia  -nutrition consult      Discharge Planning:      Home in [address]:  ADULT DIET; Full Liquid  DVT PPx: SCDs  Code status: Full Code    Hospital Problems:  Principal Problem:    Sepsis without septic shock (HCC)  Active Problems:    Disorder of fluid or electrolyte    Hyponatremia    Hypoproteinemia (HCC)    C. difficile colitis    Infectious diarrhea    Leukocytosis    Generalized abdominal pain    Abnormal CT of the abdomen    Hypertension    Diabetes mellitus ([address]    Hyperlipidemia  Resolved Problems:    * No resolved hospital problems. *      Objective:   Patient Vitals for the past 24 hrs:   Temp Pulse Resp BP SpO2   08/12/22 1548 98.2 °F (36.8 °C) 96 18 124/77 97 %   08/12/22 1144 97.3 °F (36.3 °C) 98 16 (!) 143/79 98 %   08/12/22 0949 97.7 °F (36.5 °C) 96 17 128/76 98 %   08/12/22 0417 98.1 °F (36.7 °C) 97 16 (!) 142/71 97 %   08/11/22 2231 97.5 °F (36.4 °C) 97 17 137/79 97 %   08/11/22 2035 97.5 °F (36.4 °C) 99 16 127/67 99 %         Oxygen Therapy  SpO2: 97 %  O2 Device: None (Room air)    Estimated body mass index is 27.95 kg/m² as calculated from the following:    Height as of this encounter: 4' 11\" (1.499 m). Weight as of this encounter: 138 lb 6.4 oz (62.8 kg). No intake or output data in the 24 hours ending 08/12/22 1744      Blood pressure 124/77, pulse 96, temperature 98.2 °F (36.8 °C), temperature source Oral, resp. rate 18, height 4' 11\" (1.499 m), weight 138 lb 6.4 oz (62.8 kg), SpO2 97 %. Physical Exam  Vitals and nursing note reviewed. Constitutional:       General: She is not in acute distress. Appearance: She is ill-appearing. She is not diaphoretic. Eyes:      Extraocular Movements: Extraocular movements intact. Cardiovascular:      Rate and Rhythm: Normal rate and regular rhythm.    Pulmonary:      Effort: Pulmonary effort is normal. No respiratory distress. Abdominal:      General: Bowel sounds are normal. There is no distension. Palpations: Abdomen is soft. Tenderness: There is no abdominal tenderness. There is no guarding. Musculoskeletal:         General: No deformity. Skin:     Coloration: Skin is not jaundiced or pale. Neurological:      General: No focal deficit present. Mental Status: She is alert and oriented to person, place, and time. Psychiatric:         Mood and Affect: Mood normal. Mood is not depressed. Behavior: Behavior normal. Behavior is cooperative.          Cognition and Memory: Cognition normal.         I have personally reviewed labs and tests showing:  Recent Labs:  Recent Results (from the past 48 hour(s))   Culture, Stool    Collection Time: 08/11/22  5:01 AM    Specimen: Stool   Result Value Ref Range    Special Requests NO SPECIAL REQUESTS      Culture        NO GROWTH OF SALMONELLA OR SHIGELLA IS EVIDENT ON FIRST READING, FINAL REPORT TO FOLLOW AFTER FURTHER INCUBATION OF CULTURE   Clostridium Difficile Toxin/Antigen    Collection Time: 08/11/22  5:01 AM    Specimen: Stool   Result Value Ref Range    GDH ANTIGEN C. DIFFICILE GDH ANTIGEN-POSITIVE      C difficile Toxin, EIA C. DIFFICILE TOXIN-POSITIVE      Reflex NOT APPLICABLE      C Diff Toxin Interpretation POSITIVE FOR TOXIGENIC C. DIFFICILE (AA) NTXCD      CLINICAL CONSIDERATION POSITIVE FOR TOXIGENIC C. DIFFICILE     Comprehensive Metabolic Panel w/ Reflex to MG    Collection Time: 08/11/22  6:03 AM   Result Value Ref Range    Sodium 138 136 - 145 mmol/L    Potassium 3.8 3.5 - 5.1 mmol/L    Chloride 103 98 - 107 mmol/L    CO2 28 21 - 32 mmol/L    Anion Gap 7 7 - 16 mmol/L    Glucose 100 65 - 100 mg/dL    BUN 17 8 - 23 MG/DL    Creatinine 0.65 0.6 - 1.0 MG/DL    GFR African American >60 >60 ml/min/1.73m2    GFR Non- >60 >60 ml/min/1.73m2    Calcium 9.1 8.3 - 10.4 MG/DL    Total Bilirubin 0.5 0.2 - 1.1 MG/DL    ALT 12 3.9 (H) 2.3 - 3.5 g/dL    Albumin/Globulin Ratio 0.5 (L) 1.2 - 3.5     CBC with Auto Differential    Collection Time: 08/12/22  4:16 AM   Result Value Ref Range    WBC 18.3 (H) 4.3 - 11.1 K/uL    RBC 4.54 4.05 - 5.2 M/uL    Hemoglobin 12.0 11.7 - 15.4 g/dL    Hematocrit 37.1 35.8 - 46.3 %    MCV 81.7 79.6 - 97.8 FL    MCH 26.4 26.1 - 32.9 PG    MCHC 32.3 31.4 - 35.0 g/dL    RDW 14.5 11.9 - 14.6 %    Platelets 529 (H) 448 - 450 K/uL    MPV 8.8 (L) 9.4 - 12.3 FL    nRBC 0.00 0.0 - 0.2 K/uL    Differential Type AUTOMATED      Seg Neutrophils 72 43 - 78 %    Lymphocytes 8 (L) 13 - 44 %    Monocytes 17 (H) 4.0 - 12.0 %    Eosinophils % 0 (L) 0.5 - 7.8 %    Basophils 1 0.0 - 2.0 %    Immature Granulocytes 3 0.0 - 5.0 %    Segs Absolute 13.1 (H) 1.7 - 8.2 K/UL    Absolute Lymph # 1.5 0.5 - 4.6 K/UL    Absolute Mono # 3.1 (H) 0.1 - 1.3 K/UL    Absolute Eos # 0.1 0.0 - 0.8 K/UL    Basophils Absolute 0.1 0.0 - 0.2 K/UL    Absolute Immature Granulocyte 0.5 0.0 - 0.5 K/UL   Phosphorus    Collection Time: 08/12/22  4:16 AM   Result Value Ref Range    Phosphorus 1.3 (L) 2.3 - 3.7 MG/DL   Magnesium    Collection Time: 08/12/22  4:16 AM   Result Value Ref Range    Magnesium 2.1 1.8 - 2.4 mg/dL       I have personally reviewed imaging studies showing: Other Studies:  CT ABDOMEN PELVIS W IV CONTRAST Additional Contrast? None   Final Result   1. Diffuse colonic wall thickening suggests an infectious/inflammatory colitis,   including C. difficile. 2. Small amount of ascites. 3. Blind-ending fluid-filled tubular structure could represent a dilated   appendix without surrounding inflammatory change. There could conceivably be   obstruction of the appendiceal orifice due to the extensive mucosal thickening. Secondary acute appendicitis is not excluded   4. Prominent mesenteric lymph nodes within the lower abdomen, most likely   reactive. XR CHEST PORTABLE   Final Result   No acute cardiopulmonary abnormality. Current Meds:  Current Facility-Administered Medications   Medication Dose Route Frequency    vancomycin (VANCOCIN) oral solution 125 mg  125 mg Oral 4 times per day    dextrose 5 % and 0.45 % NaCl with KCl 20 mEq infusion   IntraVENous Continuous    saccharomyces boulardii (FLORASTOR) capsule 250 mg  250 mg Oral BID    metronidazole (FLAGYL) 500 mg in 0.9% NaCl 100 mL IVPB premix  500 mg IntraVENous Q6H    sodium chloride flush 0.9 % injection 5-40 mL  5-40 mL IntraVENous PRN    ondansetron (ZOFRAN-ODT) disintegrating tablet 4 mg  4 mg Oral Q8H PRN    Or    ondansetron (ZOFRAN) injection 4 mg  4 mg IntraVENous Q6H PRN    acetaminophen (TYLENOL) tablet 650 mg  650 mg Oral Q6H PRN    potassium chloride (KLOR-CON M) extended release tablet 40 mEq  40 mEq Oral PRN    Or    potassium bicarb-citric acid (EFFER-K) effervescent tablet 40 mEq  40 mEq Oral PRN    Or    potassium chloride 10 mEq/100 mL IVPB (Peripheral Line)  10 mEq IntraVENous PRN    magnesium sulfate 2000 mg in 50 mL IVPB premix  2,000 mg IntraVENous PRN    sodium phosphate 10 mmol in sodium chloride 0.9 % 250 mL IVPB  10 mmol IntraVENous PRN    Or    sodium phosphate 15 mmol in sodium chloride 0.9 % 250 mL IVPB  15 mmol IntraVENous PRN    Or    sodium phosphate 20 mmol in sodium chloride 0.9 % 500 mL IVPB  20 mmol IntraVENous PRN    morphine (PF) injection 2 mg  2 mg IntraVENous Q4H PRN     Facility-Administered Medications Ordered in Other Encounters   Medication Dose Route Frequency    sodium chloride flush 0.9 % injection 10 mL  10 mL IntraVENous ONCE PRN       Signed:  America Streeter MD

## 2022-08-13 LAB
ANION GAP SERPL CALC-SCNC: 6 MMOL/L (ref 7–16)
BACTERIA SPEC CULT: NORMAL
BASOPHILS # BLD: 0.1 K/UL (ref 0–0.2)
BASOPHILS NFR BLD: 1 % (ref 0–2)
BUN SERPL-MCNC: 7 MG/DL (ref 8–23)
CALCIUM SERPL-MCNC: 8.8 MG/DL (ref 8.3–10.4)
CHLORIDE SERPL-SCNC: 103 MMOL/L (ref 98–107)
CO2 SERPL-SCNC: 29 MMOL/L (ref 21–32)
CREAT SERPL-MCNC: 0.56 MG/DL (ref 0.6–1)
DIFFERENTIAL METHOD BLD: ABNORMAL
EOSINOPHIL # BLD: 0.2 K/UL (ref 0–0.8)
EOSINOPHIL NFR BLD: 2 % (ref 0.5–7.8)
ERYTHROCYTE [DISTWIDTH] IN BLOOD BY AUTOMATED COUNT: 14.4 % (ref 11.9–14.6)
GLUCOSE SERPL-MCNC: 120 MG/DL (ref 65–100)
HCT VFR BLD AUTO: 38.4 % (ref 35.8–46.3)
HGB BLD-MCNC: 12.6 G/DL (ref 11.7–15.4)
IMM GRANULOCYTES # BLD AUTO: 0.8 K/UL (ref 0–0.5)
IMM GRANULOCYTES NFR BLD AUTO: 7 % (ref 0–5)
LYMPHOCYTES # BLD: 1.6 K/UL (ref 0.5–4.6)
LYMPHOCYTES NFR BLD: 14 % (ref 13–44)
MCH RBC QN AUTO: 26.5 PG (ref 26.1–32.9)
MCHC RBC AUTO-ENTMCNC: 32.8 G/DL (ref 31.4–35)
MCV RBC AUTO: 80.8 FL (ref 79.6–97.8)
MONOCYTES # BLD: 2.2 K/UL (ref 0.1–1.3)
MONOCYTES NFR BLD: 19 % (ref 4–12)
NEUTS SEG # BLD: 6.6 K/UL (ref 1.7–8.2)
NEUTS SEG NFR BLD: 57 % (ref 43–78)
NRBC # BLD: 0 K/UL (ref 0–0.2)
PHOSPHATE SERPL-MCNC: 0.7 MG/DL (ref 2.3–3.7)
PLATELET # BLD AUTO: 460 K/UL (ref 150–450)
PLATELET COMMENT: ADEQUATE
PMV BLD AUTO: 8.5 FL (ref 9.4–12.3)
POTASSIUM SERPL-SCNC: 3.3 MMOL/L (ref 3.5–5.1)
RBC # BLD AUTO: 4.75 M/UL (ref 4.05–5.2)
RBC MORPH BLD: ABNORMAL
SERVICE CMNT-IMP: NORMAL
SODIUM SERPL-SCNC: 138 MMOL/L (ref 136–145)
WBC # BLD AUTO: 11.5 K/UL (ref 4.3–11.1)
WBC MORPH BLD: ABNORMAL

## 2022-08-13 PROCEDURE — 6360000002 HC RX W HCPCS: Performed by: SURGERY

## 2022-08-13 PROCEDURE — 2580000003 HC RX 258: Performed by: SURGERY

## 2022-08-13 PROCEDURE — 85025 COMPLETE CBC W/AUTO DIFF WBC: CPT

## 2022-08-13 PROCEDURE — 80048 BASIC METABOLIC PNL TOTAL CA: CPT

## 2022-08-13 PROCEDURE — 6370000000 HC RX 637 (ALT 250 FOR IP): Performed by: FAMILY MEDICINE

## 2022-08-13 PROCEDURE — 2500000003 HC RX 250 WO HCPCS: Performed by: SURGERY

## 2022-08-13 PROCEDURE — 1100000000 HC RM PRIVATE

## 2022-08-13 PROCEDURE — 84100 ASSAY OF PHOSPHORUS: CPT

## 2022-08-13 PROCEDURE — 36415 COLL VENOUS BLD VENIPUNCTURE: CPT

## 2022-08-13 RX ADMIN — POTASSIUM CHLORIDE, DEXTROSE MONOHYDRATE AND SODIUM CHLORIDE: 150; 5; 450 INJECTION, SOLUTION INTRAVENOUS at 17:13

## 2022-08-13 RX ADMIN — METRONIDAZOLE 500 MG: 500 INJECTION, SOLUTION INTRAVENOUS at 11:56

## 2022-08-13 RX ADMIN — VANCOMYCIN HYDROCHLORIDE 125 MG: 5 INJECTION, POWDER, LYOPHILIZED, FOR SOLUTION INTRAVENOUS at 08:27

## 2022-08-13 RX ADMIN — METRONIDAZOLE 500 MG: 500 INJECTION, SOLUTION INTRAVENOUS at 17:13

## 2022-08-13 RX ADMIN — VANCOMYCIN HYDROCHLORIDE 125 MG: 5 INJECTION, POWDER, LYOPHILIZED, FOR SOLUTION INTRAVENOUS at 17:13

## 2022-08-13 RX ADMIN — Medication 250 MG: at 21:21

## 2022-08-13 RX ADMIN — VANCOMYCIN HYDROCHLORIDE 125 MG: 5 INJECTION, POWDER, LYOPHILIZED, FOR SOLUTION INTRAVENOUS at 23:06

## 2022-08-13 RX ADMIN — POTASSIUM CHLORIDE, DEXTROSE MONOHYDRATE AND SODIUM CHLORIDE: 150; 5; 450 INJECTION, SOLUTION INTRAVENOUS at 08:27

## 2022-08-13 RX ADMIN — METRONIDAZOLE 500 MG: 500 INJECTION, SOLUTION INTRAVENOUS at 23:06

## 2022-08-13 RX ADMIN — METRONIDAZOLE 500 MG: 500 INJECTION, SOLUTION INTRAVENOUS at 05:31

## 2022-08-13 RX ADMIN — VANCOMYCIN HYDROCHLORIDE 125 MG: 5 INJECTION, POWDER, LYOPHILIZED, FOR SOLUTION INTRAVENOUS at 01:41

## 2022-08-13 RX ADMIN — Medication 250 MG: at 08:26

## 2022-08-13 RX ADMIN — VANCOMYCIN HYDROCHLORIDE 125 MG: 5 INJECTION, POWDER, LYOPHILIZED, FOR SOLUTION INTRAVENOUS at 11:56

## 2022-08-13 ASSESSMENT — PAIN SCALES - GENERAL: PAINLEVEL_OUTOF10: 0

## 2022-08-13 NOTE — PROGRESS NOTES
Hospitalist Progress Note   Admit Date:  8/10/2022 12:11 PM   Name:  Carri Muñiz   Age:  77 y.o. Sex:  female  :  1955   MRN:  [id number]   Room:  [facility]/    Presenting Complaint: Abdominal Pain     Reason(s) for Admission: Colitis [K52.9]  MILLA (acute kidney injury) ([facility] [address] [N17.9]  Leukocytosis, unspecified type [D72.829]  Acute appendicitis with generalized peritonitis, unspecified whether abscess present, unspecified whether gangrene present, unspecified whether perforation present [K35.20]  Sepsis without septic shock [facility]) [A41.9]     Hospital Course & Interval History:   77 y.o. female with medical history of hypertension, hyperlipidemia, diabetes who presented with intractable abdominal pain. She was previously seen in the emergency department 2 weeks prior with nausea and vomiting. She was started on a course of antibiotics without resolution of symptoms. Her nausea has gotten much worse she has painful abdomen with diarrhea. She goes to the bathroom approximately 4-6 times per days with loose watery stools. Subjective/24hr Events (22): Mild abdominal pain with food. Patient nearly tolerating advanced diet. Still numerous bowel movements, watery in character. Tolerating oral vancomycin. Assessment & Plan:   Sepsis without septic shock due to infectious diarrhea (C diff)  Admission WBC 23, , source infectious diarrhea; cefepime (8/10)  -Metronidazole (8/10-. .), vancomycin po (-. .)  -Diarrhea labs  -Follow cultures  2022: Continue antibiotics, advance diet as tolerated     Acute kidney injury  resolved     Fluid or electrolyte disorder  2022: hypokalemia, replete, recheck     Hypertension  -Hold lisinopril, hydrochlorothiazide for MILLA  2022: BP stable off meds, continue to hold      Diabetes mellitus  Admission serum glucose 126  -Start insulin if needed  2022: glc 120, continue without insulin Hyperlipidemia  -Rosuvastatin    Hypoproteinemia  -nutrition consult      Discharge Planning:      Home in 1-3d    Diet:  ADULT DIET; Full Liquid  DVT PPx: SCDs  Code status: Full Code    Hospital Problems:  Principal Problem:    Sepsis without septic shock (HCC)  Active Problems:    Disorder of fluid or electrolyte    Hyponatremia    Hypoproteinemia (HCC)    C. difficile colitis    Infectious diarrhea    Leukocytosis    Generalized abdominal pain    Abnormal CT of the abdomen    Hypertension    Diabetes mellitus ([address]    Hyperlipidemia  Resolved Problems:    * No resolved hospital problems. *      Objective:   Patient Vitals for the past 24 hrs:   Temp Pulse Resp BP SpO2   08/13/22 1151 97.5 °F (36.4 °C) 78 18 126/82 99 %   08/13/22 0742 98.1 °F (36.7 °C) 82 17 125/79 97 %   08/13/22 0332 97.9 °F (36.6 °C) 88 17 137/82 95 %   08/13/22 0000 98.4 °F (36.9 °C) 91 16 128/63 95 %   08/12/22 2019 97.9 °F (36.6 °C) 91 17 129/76 96 %   08/12/22 1548 98.2 °F (36.8 °C) 96 18 124/77 97 %         Oxygen Therapy  SpO2: 99 %  O2 Device: None (Room air)    Estimated body mass index is 27.95 kg/m² as calculated from the following:    Height as of this encounter: 4' 11\" (1.499 m). Weight as of this encounter: 138 lb 6.4 oz (62.8 kg). No intake or output data in the 24 hours ending 08/13/22 1541      Blood pressure 126/82, pulse 78, temperature 97.5 °F (36.4 °C), temperature source Oral, resp. rate 18, height 4' 11\" (1.499 m), weight 138 lb 6.4 oz (62.8 kg), SpO2 99 %. Physical Exam  Vitals and nursing note reviewed. Constitutional:       General: She is not in acute distress. Appearance: She is ill-appearing. She is not diaphoretic. Eyes:      Extraocular Movements: Extraocular movements intact. Cardiovascular:      Rate and Rhythm: Normal rate and regular rhythm. Pulmonary:      Effort: Pulmonary effort is normal. No respiratory distress. Abdominal:      General: Abdomen is flat.  Bowel sounds are normal. There Lymphocytes 8 (L) 13 - 44 %    Monocytes 17 (H) 4.0 - 12.0 %    Eosinophils % 0 (L) 0.5 - 7.8 %    Basophils 1 0.0 - 2.0 %    Immature Granulocytes 3 0.0 - 5.0 %    Segs Absolute 13.1 (H) 1.7 - 8.2 K/UL    Absolute Lymph # 1.5 0.5 - 4.6 K/UL    Absolute Mono # 3.1 (H) 0.1 - 1.3 K/UL    Absolute Eos # 0.1 0.0 - 0.8 K/UL    Basophils Absolute 0.1 0.0 - 0.2 K/UL    Absolute Immature Granulocyte 0.5 0.0 - 0.5 K/UL   Phosphorus    Collection Time: 08/12/22  4:16 AM   Result Value Ref Range    Phosphorus 1.3 (L) 2.3 - 3.7 MG/DL   Magnesium    Collection Time: 08/12/22  4:16 AM   Result Value Ref Range    Magnesium 2.1 1.8 - 2.4 mg/dL   Basic Metabolic Panel    Collection Time: 08/13/22  6:11 AM   Result Value Ref Range    Sodium 138 136 - 145 mmol/L    Potassium 3.3 (L) 3.5 - 5.1 mmol/L    Chloride 103 98 - 107 mmol/L    CO2 29 21 - 32 mmol/L    Anion Gap 6 (L) 7 - 16 mmol/L    Glucose 120 (H) 65 - 100 mg/dL    BUN 7 (L) 8 - 23 MG/DL    Creatinine 0.56 (L) 0.6 - 1.0 MG/DL    GFR African American >60 >60 ml/min/1.73m2    GFR Non- >60 >60 ml/min/1.73m2    Calcium 8.8 8.3 - 10.4 MG/DL   CBC with Auto Differential    Collection Time: 08/13/22  6:11 AM   Result Value Ref Range    WBC 11.5 (H) 4.3 - 11.1 K/uL    RBC 4.75 4.05 - 5.2 M/uL    Hemoglobin 12.6 11.7 - 15.4 g/dL    Hematocrit 38.4 35.8 - 46.3 %    MCV 80.8 79.6 - 97.8 FL    MCH 26.5 26.1 - 32.9 PG    MCHC 32.8 31.4 - 35.0 g/dL    RDW 14.4 11.9 - 14.6 %    Platelets 394 (H) 590 - 450 K/uL    MPV 8.5 (L) 9.4 - 12.3 FL    nRBC 0.00 0.0 - 0.2 K/uL    Seg Neutrophils 57 43 - 78 %    Lymphocytes 14 13 - 44 %    Monocytes 19 (H) 4.0 - 12.0 %    Eosinophils % 2 0.5 - 7.8 %    Basophils 1 0.0 - 2.0 %    Immature Granulocytes 7 (H) 0.0 - 5.0 %    Segs Absolute 6.6 1.7 - 8.2 K/UL    Absolute Lymph # 1.6 0.5 - 4.6 K/UL    Absolute Mono # 2.2 (H) 0.1 - 1.3 K/UL    Absolute Eos # 0.2 0.0 - 0.8 K/UL    Basophils Absolute 0.1 0.0 - 0.2 K/UL    Absolute Immature Granulocyte 0.8 (H) 0.0 - 0.5 K/UL    RBC Comment NORMOCYTIC/NORMOCHROMIC      WBC Comment Result Confirmed By Smear      Platelet Comment ADEQUATE      Differential Type AUTOMATED     Phosphorus    Collection Time: 08/13/22  6:11 AM   Result Value Ref Range    Phosphorus 0.7 (LL) 2.3 - 3.7 MG/DL       I have personally reviewed imaging studies showing: Other Studies:  CT ABDOMEN PELVIS W IV CONTRAST Additional Contrast? None   Final Result   1. Diffuse colonic wall thickening suggests an infectious/inflammatory colitis,   including C. difficile. 2. Small amount of ascites. 3. Blind-ending fluid-filled tubular structure could represent a dilated   appendix without surrounding inflammatory change. There could conceivably be   obstruction of the appendiceal orifice due to the extensive mucosal thickening. Secondary acute appendicitis is not excluded   4. Prominent mesenteric lymph nodes within the lower abdomen, most likely   reactive. XR CHEST PORTABLE   Final Result   No acute cardiopulmonary abnormality.              Current Meds:  Current Facility-Administered Medications   Medication Dose Route Frequency    vancomycin (VANCOCIN) oral solution 125 mg  125 mg Oral 4 times per day    dextrose 5 % and 0.45 % NaCl with KCl 20 mEq infusion   IntraVENous Continuous    saccharomyces boulardii (FLORASTOR) capsule 250 mg  250 mg Oral BID    metronidazole (FLAGYL) 500 mg in 0.9% NaCl 100 mL IVPB premix  500 mg IntraVENous Q6H    sodium chloride flush 0.9 % injection 5-40 mL  5-40 mL IntraVENous PRN    ondansetron (ZOFRAN-ODT) disintegrating tablet 4 mg  4 mg Oral Q8H PRN    Or    ondansetron (ZOFRAN) injection 4 mg  4 mg IntraVENous Q6H PRN    acetaminophen (TYLENOL) tablet 650 mg  650 mg Oral Q6H PRN    potassium chloride (KLOR-CON M) extended release tablet 40 mEq  40 mEq Oral PRN    Or    potassium bicarb-citric acid (EFFER-K) effervescent tablet 40 mEq  40 mEq Oral PRN    Or    potassium chloride 10 mEq/100 mL IVPB (Peripheral Line)  10 mEq IntraVENous PRN    magnesium sulfate 2000 mg in 50 mL IVPB premix  2,000 mg IntraVENous PRN    sodium phosphate 10 mmol in sodium chloride 0.9 % 250 mL IVPB  10 mmol IntraVENous PRN    Or    sodium phosphate 15 mmol in sodium chloride 0.9 % 250 mL IVPB  15 mmol IntraVENous PRN    Or    sodium phosphate 20 mmol in sodium chloride 0.9 % 500 mL IVPB  20 mmol IntraVENous PRN    morphine (PF) injection 2 mg  2 mg IntraVENous Q4H PRN     Facility-Administered Medications Ordered in Other Encounters   Medication Dose Route Frequency    sodium chloride flush 0.9 % injection 10 mL  10 mL IntraVENous ONCE PRN       Signed:  Lisbeth Martino MD

## 2022-08-13 NOTE — PROGRESS NOTES
H&P/Consult Note/Progress Note/Office Note:   Tejinder Hopson  MRN: [id number]  :1955  Age:66 y.o.    HPI: Tejinder Hopson is a 77 y.o. female who presents septic due to C diff colitis           Past Medical History:   Diagnosis Date    Diabetes mellitus ([facility] [address]     not on medications hGB a1C 6.5    Endometriosis     total hysterectomy w/o BSO    Hyperlipidemia     Hypertension     not taking medication    Menopause      Past Surgical History:   Procedure Laterality Date    BREAST BIOPSY      BREAST SURGERY      benign lump    HYSTERECTOMY (CERVIX STATUS UNKNOWN)      Total for endometriosis    HYSTERECTOMY, TOTAL ABDOMINAL (CERVIX REMOVED)      ovaries intact     Current Facility-Administered Medications   Medication Dose Route Frequency    vancomycin (VANCOCIN) oral solution 125 mg  125 mg Oral 4 times per day    dextrose 5 % and 0.45 % NaCl with KCl 20 mEq infusion   IntraVENous Continuous    saccharomyces boulardii (FLORASTOR) capsule 250 mg  250 mg Oral BID    metronidazole (FLAGYL) 500 mg in 0.9% NaCl 100 mL IVPB premix  500 mg IntraVENous Q6H    sodium chloride flush 0.9 % injection 5-40 mL  5-40 mL IntraVENous PRN    ondansetron (ZOFRAN-ODT) disintegrating tablet 4 mg  4 mg Oral Q8H PRN    Or    ondansetron (ZOFRAN) injection 4 mg  4 mg IntraVENous Q6H PRN    acetaminophen (TYLENOL) tablet 650 mg  650 mg Oral Q6H PRN    potassium chloride (KLOR-CON M) extended release tablet 40 mEq  40 mEq Oral PRN    Or    potassium bicarb-citric acid (EFFER-K) effervescent tablet 40 mEq  40 mEq Oral PRN    Or    potassium chloride 10 mEq/100 mL IVPB (Peripheral Line)  10 mEq IntraVENous PRN    magnesium sulfate 2000 mg in 50 mL IVPB premix  2,000 mg IntraVENous PRN    sodium phosphate 10 mmol in sodium chloride 0.9 % 250 mL IVPB  10 mmol IntraVENous PRN    Or    sodium phosphate 15 mmol in sodium chloride 0.9 % 250 mL IVPB  15 mmol IntraVENous PRN    Or    sodium phosphate 20 mmol in sodium chloride 0.9 % 500 mL IVPB  20 mmol IntraVENous PRN    morphine (PF) injection 2 mg  2 mg IntraVENous Q4H PRN     Facility-Administered Medications Ordered in Other Encounters   Medication Dose Route Frequency    sodium chloride flush 0.9 % injection 10 mL  10 mL IntraVENous ONCE PRN     ALLERGIES:  Chocolate    Social History     Socioeconomic History    Marital status: Single     Spouse name: None    Number of children: None    Years of education: None    Highest education level: None   Tobacco Use    Smoking status: Never    Smokeless tobacco: Never   Substance and Sexual Activity    Alcohol use: No    Drug use: No     Social History     Tobacco Use   Smoking Status Never   Smokeless Tobacco Never     Family History   Problem Relation Age of Onset    Colon Cancer Neg Hx     Ovarian Cancer Neg Hx     Breast Cancer Neg Hx     No Known Problems Brother     No Known Problems Brother     No Known Problems Sister     No Known Problems Father     Osteoarthritis Mother     Uterine Cancer Neg Hx      ROS: The patient has no difficulty with chest pain or shortness of breath. No fever or chills. Comprehensive review of systems was otherwise unremarkable except as noted above. Physical Exam:   /79   Pulse 82   Temp 98.1 °F (36.7 °C) (Oral)   Resp 17   Ht 4' 11\" (1.499 m)   Wt 138 lb 6.4 oz (62.8 kg)   SpO2 97%   BMI 27.95 kg/m²   Vitals:    08/12/22 2019 08/13/22 0000 08/13/22 0332 08/13/22 0742   BP: 129/76 128/63 137/82 125/79   Pulse: 91 91 88 82   Resp: 17 16 17 17   Temp: 97.9 °F (36.6 °C) 98.4 °F (36.9 °C) 97.9 °F (36.6 °C) 98.1 °F (36.7 °C)   TempSrc: Oral Axillary Axillary Oral   SpO2: 96% 95% 95% 97%   Weight:       Height:         No intake/output data recorded. No intake/output data recorded. Constitutional: Alert, oriented, cooperative patient in no acute distress; appears stated age    Eyes:Sclera are clear.  EOMs intact  ENMT: no external lesions gross hearing normal; no obvious neck masses, no ear or lip lesions, nares normal  CV: RRR. Normal perfusion  Resp: No JVD. Breathing is  non-labored; no audible wheezing. GI: soft and non-distended     Musculoskeletal: unremarkable with normal function. No embolic signs or cyanosis. Neuro:  Oriented; moves all 4; no focal deficits  Psychiatric: normal affect and mood, no memory impairment    Recent vitals (if inpt):  Patient Vitals for the past 24 hrs:   BP Temp Temp src Pulse Resp SpO2   08/13/22 0742 125/79 98.1 °F (36.7 °C) Oral 82 17 97 %   08/13/22 0332 137/82 97.9 °F (36.6 °C) Axillary 88 17 95 %   08/13/22 0000 128/63 98.4 °F (36.9 °C) Axillary 91 16 95 %   08/12/22 2019 129/76 97.9 °F (36.6 °C) Oral 91 17 96 %   08/12/22 1548 124/77 98.2 °F (36.8 °C) Oral 96 18 97 %   08/12/22 1144 (!) 143/79 97.3 °F (36.3 °C) Oral 98 16 98 %       Amount and/or Complexity of Data Reviewed and Analyzed:  I reviewed and analyzed all of the unique labs and radiologic studies that are shown below as well as any that are in the HPI, and any that are in the expanded problem list below  *Each unique test, order, or document contributes to the combination of 2 or combination of 3 in Category 1 below. For this visit I also reviewed old records and prior notes.       Recent Labs     08/12/22  0416 08/13/22  0611   WBC 18.3* 11.5*   HGB 12.0 12.6   * 460*    138   K 3.0* 3.3*    103   CO2 28 29   BUN 13 7*   ALT 10*  --      Review of most recent CBC  Lab Results   Component Value Date    WBC 11.5 (H) 08/13/2022    HGB 12.6 08/13/2022    HCT 38.4 08/13/2022    MCV 80.8 08/13/2022     (H) 08/13/2022       Review of most recent BMP  Lab Results   Component Value Date/Time     08/13/2022 06:11 AM    K 3.3 08/13/2022 06:11 AM     08/13/2022 06:11 AM    CO2 29 08/13/2022 06:11 AM    BUN 7 08/13/2022 06:11 AM    CREATININE 0.56 08/13/2022 06:11 AM    GLUCOSE 120 08/13/2022 06:11 AM    CALCIUM 8.8 08/13/2022 06:11 AM        Review of most recent LFTs (and lipase if done)  Lab Results   Component Value Date    ALT 10 (L) 08/12/2022    AST 11 (L) 08/12/2022    ALKPHOS 65 08/12/2022    BILITOT 0.4 08/12/2022     Lab Results   Component Value Date    LIPASE 31 (L) 08/10/2022       No results found for: INR, APTT, LCAD    Review of most recent HgbA1c  No results found for: LABA1C  No results found for: EAG    Nutritional assessment screen for wound healing issues:  Lab Results   Component Value Date    LABALBU 1.8 (L) 08/12/2022       @lastcovr@    Xray Result (most recent):  XR CHEST PORTABLE 08/10/2022    Narrative  EXAM: XR CHEST PORTABLE  INDICATION: sepsis  COMPARISON: Chest radiograph, 1/16/2016    FINDINGS:  The cardiomediastinal silhouette is within normal limits. No focal parenchymal  process. No pleural effusion. No pneumothorax. No acute osseous abnormality. Impression  No acute cardiopulmonary abnormality. CT Result (most recent):  CT ABDOMEN PELVIS W IV CONTRAST 08/10/2022    Narrative  CT ABDOMEN AND PELVIS WITH CONTRAST    HISTORY:  Abdominal pain and vomiting x2 weeks. Infrequent bowel movements. TECHNIQUE: Helically acquired images were obtained from the domes of the  diaphragms to the ischial tuberosities reconstructed at 5mm intervals after the  uneventful administration of 100c's of intravenous Isovue-370 in order to better  evaluate the solid abdominal viscera and vascular structures. Oral contrast was  not administered per the emergency imaging BMI protocol. Coronal and sagittal  reformatted images were submitted. Radiation dose reduction techniques were used for this study:  Our CT scanners  use one or all of the following: Automated exposure control, adjustment of the  mA and/or kVp according to patient's size, iterative reconstruction. COMPARISON: None. Correlation is made to the abdominal series 07/25/2022. CT ABDOMEN: There is no hepatic or splenic lesion. The pancreas and adrenal  glands unremarkable.  The kidneys enhance symmetrically without discrete  abnormality. There is a small amount of ascites within the right abdomen. There is diffuse  relatively extensive colonic wall thickening, particularly of the ascending  colon. There is no small bowel dilatation. There are few mesenteric lymph nodes  one of which measures up to 17 mm in short axis within the lower mid abdomen. There is a fluid filled tubular structure extending superiorly from the cecum. This could represent a dilated appendix measuring up to 16 mm in diameter  although there are no surrounding inflammatory changes. CT PELVIS: Continued wall thickening of the colon is present to the level of the  rectum. The pelvic structures are unremarkable. No aggressive osseous lesion is  present. Impression  1. Diffuse colonic wall thickening suggests an infectious/inflammatory colitis,  including C. difficile. 2. Small amount of ascites. 3. Blind-ending fluid-filled tubular structure could represent a dilated  appendix without surrounding inflammatory change. There could conceivably be  obstruction of the appendiceal orifice due to the extensive mucosal thickening. Secondary acute appendicitis is not excluded  4. Prominent mesenteric lymph nodes within the lower abdomen, most likely  reactive. US Result (most recent):   BREAST LIMITED 03/11/2021    Narrative  This is a summary report. The complete report is available in the patient's medical record. If you cannot access the medical record, please contact the sending organization for a detailed fax or copy. BILATERAL DIAGNOSTIC DIGITAL MAMMOGRAPHY WITH TOMOSYNTHESIS,  BILATERAL BREAST SONOGRAPHY:    CLINICAL HISTORY:  72year-old status post prior benign left surgical biopsy  presents for evaluation of intermittent pain laterally in the left breast for  approximately 6 months.     COMPARISON:  The patient reports prior outside mammograms, but none could be  located for comparison at this time.    BILATERAL MAMMOGRAM:  No palpable lump was reported for placement of a skin  marker at the time of examination. Bilateral craniocaudal and mediolateral  oblique views with digital tomography as well as a left lateral view demonstrate  scattered fibroglandular tissue bilaterally. There are a few scattered  typically benign bilateral calcifications. There is a very small nodule  centrally at the right 10:00 position adjacent to a blood vessel. No other  discrete soft tissue mass, cluster of suspicious microcalcifications, or other  evidence of malignancy is seen in either breast.    BILATERAL ULTRASOUND:  Images from careful ultrasound evaluation demonstrate a 3  mm lymph node with distinct fatty and vascular hilum centrally at the right  10:00 position 9 cm from the nipple corresponding well in size, configuration,  and location with the mammographic nodule. Images from the painful lateral left  breast demonstrate no discrete cystic or solid mass, and no abnormal acoustical  shadowing suspicious for malignancy is seen. Nevertheless, it should be noted  that mammography and ultrasound fail to detect a small percentage of carcinoma,  and therefore any area of persistent painful concern must be managed clinically. Impression  INCIDENTAL RIGHT CENTRAL 10:00 SMALL INTRAMAMMARY LYMPH NODE WITH NO DEFINITE  EVIDENCE OF MALIGNANCY IN EITHER BREAST. FOLLOW-UP BILATERAL SCREENING  MAMMOGRAPHY IS RECOMMENDED IN ONE YEAR. BI-RADS Assessment Category 2: Benign finding.   BD2      Admission date (for inpatients): 8/10/2022   * No surgery found *  * No surgery found *        ASSESSMENT/PLAN:  [unfilled]  Principal Problem:    Sepsis without septic shock ([facility] [address]  Active Problems:    Disorder of fluid or electrolyte    Hyponatremia    Hypoproteinemia (HCC)    C. difficile colitis    Infectious diarrhea    Leukocytosis    Generalized abdominal pain    Abnormal CT of the abdomen    Hypertension    Diabetes mellitus ([facility]    Hyperlipidemia  Resolved Problems:    * No resolved hospital problems.  *     Patient Active Problem List    Diagnosis Date Noted    Hypoproteinemia ([facility] 08/11/2022     Priority: Medium    C. difficile colitis 08/11/2022     Priority: Medium    Infectious diarrhea 08/11/2022     Priority: Medium    Leukocytosis 08/11/2022     Priority: Medium    Generalized abdominal pain 08/11/2022     Priority: Medium    Abnormal CT of the abdomen 08/11/2022     Priority: Medium    Sepsis without septic shock ([facility] 08/10/2022     Priority: Medium    Disorder of fluid or electrolyte 08/10/2022     Priority: Medium    Hyponatremia 08/10/2022     Priority: Medium    Hypertension      not taking medication        Diabetes mellitus ([facility]     Hyperlipidemia       A/P  C diff colitis    Improving ; minimal abdominal discomfort, WBC down to 11K  Tolerating clears and less episodes of diarrhea.    -continue FLAGYl - VANCO

## 2022-08-13 NOTE — PROGRESS NOTES
Medical Student Progress Note   Admit Date:  8/10/2022 12:11 PM   Name:  Rosangela Medrano   Age:  77 y.o. Sex:  female  :  1955   MRN:  [id number]     Presenting Complaint: Abdominal Pain    Reason(s) for Admission: Colitis [K52.9]  MILLA (acute kidney injury) ([facility] [N17.9]  Leukocytosis, unspecified type [D72.829]  Acute appendicitis with generalized peritonitis, unspecified whether abscess present, unspecified whether gangrene present, unspecified whether perforation present [K35.20]  Sepsis without septic shock [facility]) [A41.9]       Hospital Course & Interval History:   Leon Cavazos is a 77 y.o. female with a history hypertension, hyperlipidemia, and diabetes who presented to the ED (8/10) with abdominal pain. Patient seen in ED 2 weeks ago for nausea and vomiting. She was given antibiotics which did not resolve her symptoms. On admission she reported diarrhea twice last night and emesis after given liquid PO. Nausea has resolved but states she feels very weak with continued epigastric abdominal pain. She uses the bathroom 4-6 times per day with loose stools. Subjective (22): Intermittent epigastric abdominal pain. Patient requesting advance diet. Able to tolerate fruit juice. Last night multiple bowel movements, loose stool. Tolerating oral vancomycin. Assessment & Plan:   Sepsis due to infectious diarrhea (C. diff)  WBC 11.5 (), WBC 18.3 (), WBC 23 on admission. HR is 82, improved from 107 (). Source is infectious diarrhea. Stool culture positive for C. Difficile. Placed on vancomycin. Cefepime discontinued.  -Continue vancomycin and metronidazole   -Serial CBC  -Advance diet as tolerated    Acute Kidney Injury  Admission sCr 1.1. sCr 0.53 resolved on (). Today sCr 0.56.  -Recheck BMP  -Avoid nephrotoxic substances     Fluid or electrolyte disorder  Hypophosphatemia and hypokalemia  Potassium 3.3.  Phosphorus 0.7  -Replete and recheck     Hypertension  BP is stable off medications.  -Hold lisinopril and hydrochlorothiazide for potential MILLA. Diabetes mellitus  Admission serum glucose 126. Glucose today 120 without insulin.  -Insulin if needed    Hyperlipidemia  -Continue Rosuvastatin    Hypoproteinemia  Total protein 5.7 (8/11)  -Consult nutrition  -Recheck    Diet:  ADULT DIET; Full Liquid  DVT PPx: SCD  Code status: Full Code    Physical Exam  Constitutional:       Appearance: She is ill-appearing. HENT:      Head: Normocephalic and atraumatic. Eyes:      Conjunctiva/sclera: Conjunctivae normal.   Cardiovascular:      Rate and Rhythm: Normal rate. Pulmonary:      Effort: Pulmonary effort is normal.      Breath sounds: Normal breath sounds. Abdominal:      Tenderness: There is abdominal tenderness. Musculoskeletal:      Right lower leg: No edema. Left lower leg: No edema. Neurological:      Mental Status: She is alert and oriented to person, place, and time.         Active Hospital Problems    Diagnosis Date Noted    Hypoproteinemia ([facility] 08/11/2022     Priority: Medium    C. difficile colitis 08/11/2022     Priority: Medium    Infectious diarrhea 08/11/2022     Priority: Medium    Leukocytosis 08/11/2022     Priority: Medium    Generalized abdominal pain 08/11/2022     Priority: Medium    Abnormal CT of the abdomen 08/11/2022     Priority: Medium    Sepsis without septic shock ([facility] [address] 08/10/2022     Priority: Medium    Disorder of fluid or electrolyte 08/10/2022     Priority: Medium    Hyponatremia 08/10/2022     Priority: Medium    Hypertension      not taking medication        Diabetes mellitus ([facility]     Hyperlipidemia      Objective:   Patient Vitals for the past 24 hrs:   Temp Pulse Resp BP SpO2   08/13/22 0742 98.1 °F (36.7 °C) 82 17 125/79 97 %   08/13/22 0332 97.9 °F (36.6 °C) 88 17 137/82 95 %   08/13/22 0000 98.4 °F (36.9 °C) 91 16 128/63 95 %   08/12/22 2019 97.9 °F (36.6 °C) 91 17 129/76 96 %   08/12/22 1548 98.2 °F (36.8 °C) 96 18 124/77 97 % 08/12/22 1144 97.3 °F (36.3 °C) 98 16 (!) 143/79 98 %   08/12/22 0949 97.7 °F (36.5 °C) 96 17 128/76 98 %       @BSHSIFLOW(2444:last)@    Estimated body mass index is 27.95 kg/m² as calculated from the following:    Height as of this encounter: 4' 11\" (1.499 m). Weight as of this encounter: 138 lb 6.4 oz (62.8 kg).   No intake or output data in the 24 hours ending 08/13/22 0838    I have reviewed ordered lab tests and independently visualized imaging below:    Last 24hr Labs:  Recent Results (from the past 24 hour(s))   Basic Metabolic Panel    Collection Time: 08/13/22  6:11 AM   Result Value Ref Range    Sodium 138 136 - 145 mmol/L    Potassium 3.3 (L) 3.5 - 5.1 mmol/L    Chloride 103 98 - 107 mmol/L    CO2 29 21 - 32 mmol/L    Anion Gap 6 (L) 7 - 16 mmol/L    Glucose 120 (H) 65 - 100 mg/dL    BUN 7 (L) 8 - 23 MG/DL    Creatinine 0.56 (L) 0.6 - 1.0 MG/DL    GFR African American >60 >60 ml/min/1.73m2    GFR Non- >60 >60 ml/min/1.73m2    Calcium 8.8 8.3 - 10.4 MG/DL   CBC with Auto Differential    Collection Time: 08/13/22  6:11 AM   Result Value Ref Range    WBC 11.5 (H) 4.3 - 11.1 K/uL    RBC 4.75 4.05 - 5.2 M/uL    Hemoglobin 12.6 11.7 - 15.4 g/dL    Hematocrit 38.4 35.8 - 46.3 %    MCV 80.8 79.6 - 97.8 FL    MCH 26.5 26.1 - 32.9 PG    MCHC 32.8 31.4 - 35.0 g/dL    RDW 14.4 11.9 - 14.6 %    Platelets 483 (H) 972 - 450 K/uL    MPV 8.5 (L) 9.4 - 12.3 FL    nRBC 0.00 0.0 - 0.2 K/uL    Seg Neutrophils 57 43 - 78 %    Lymphocytes 14 13 - 44 %    Monocytes 19 (H) 4.0 - 12.0 %    Eosinophils % 2 0.5 - 7.8 %    Basophils 1 0.0 - 2.0 %    Immature Granulocytes 7 (H) 0.0 - 5.0 %    Segs Absolute 6.6 1.7 - 8.2 K/UL    Absolute Lymph # 1.6 0.5 - 4.6 K/UL    Absolute Mono # 2.2 (H) 0.1 - 1.3 K/UL    Absolute Eos # 0.2 0.0 - 0.8 K/UL    Basophils Absolute 0.1 0.0 - 0.2 K/UL    Absolute Immature Granulocyte 0.8 (H) 0.0 - 0.5 K/UL    RBC Comment NORMOCYTIC/NORMOCHROMIC      WBC Comment Result Confirmed By Smear      Platelet Comment ADEQUATE      Differential Type AUTOMATED     Phosphorus    Collection Time: 08/13/22  6:11 AM   Result Value Ref Range    Phosphorus 0.7 (LL) 2.3 - 3.7 MG/DL       [unfilled]    Other Studies:  [unfilled]    Current Meds:  Current Facility-Administered Medications   Medication Dose Route Frequency    vancomycin (VANCOCIN) oral solution 125 mg  125 mg Oral 4 times per day    dextrose 5 % and 0.45 % NaCl with KCl 20 mEq infusion   IntraVENous Continuous    saccharomyces boulardii (FLORASTOR) capsule 250 mg  250 mg Oral BID    metronidazole (FLAGYL) 500 mg in 0.9% NaCl 100 mL IVPB premix  500 mg IntraVENous Q6H    sodium chloride flush 0.9 % injection 5-40 mL  5-40 mL IntraVENous PRN    ondansetron (ZOFRAN-ODT) disintegrating tablet 4 mg  4 mg Oral Q8H PRN    Or    ondansetron (ZOFRAN) injection 4 mg  4 mg IntraVENous Q6H PRN    acetaminophen (TYLENOL) tablet 650 mg  650 mg Oral Q6H PRN    potassium chloride (KLOR-CON M) extended release tablet 40 mEq  40 mEq Oral PRN    Or    potassium bicarb-citric acid (EFFER-K) effervescent tablet 40 mEq  40 mEq Oral PRN    Or    potassium chloride 10 mEq/100 mL IVPB (Peripheral Line)  10 mEq IntraVENous PRN    magnesium sulfate 2000 mg in 50 mL IVPB premix  2,000 mg IntraVENous PRN    sodium phosphate 10 mmol in sodium chloride 0.9 % 250 mL IVPB  10 mmol IntraVENous PRN    Or    sodium phosphate 15 mmol in sodium chloride 0.9 % 250 mL IVPB  15 mmol IntraVENous PRN    Or    sodium phosphate 20 mmol in sodium chloride 0.9 % 500 mL IVPB  20 mmol IntraVENous PRN    morphine (PF) injection 2 mg  2 mg IntraVENous Q4H PRN     Facility-Administered Medications Ordered in Other Encounters   Medication Dose Route Frequency    sodium chloride flush 0.9 % injection 10 mL  10 mL IntraVENous ONCE PRN       Signed:  Kip Coleman

## 2022-08-14 VITALS
HEIGHT: 59 IN | OXYGEN SATURATION: 97 % | DIASTOLIC BLOOD PRESSURE: 80 MMHG | HEART RATE: 76 BPM | RESPIRATION RATE: 16 BRPM | SYSTOLIC BLOOD PRESSURE: 145 MMHG | BODY MASS INDEX: 27.9 KG/M2 | WEIGHT: 138.4 LBS | TEMPERATURE: 97.7 F

## 2022-08-14 PROBLEM — A09 INFECTIOUS DIARRHEA: Status: RESOLVED | Noted: 2022-08-11 | Resolved: 2022-08-14

## 2022-08-14 PROBLEM — R10.84 GENERALIZED ABDOMINAL PAIN: Status: RESOLVED | Noted: 2022-08-11 | Resolved: 2022-08-14

## 2022-08-14 PROBLEM — E44.0 MODERATE PROTEIN-CALORIE MALNUTRITION (HCC): Status: ACTIVE | Noted: 2022-08-14

## 2022-08-14 PROBLEM — E87.1 HYPONATREMIA: Status: RESOLVED | Noted: 2022-08-10 | Resolved: 2022-08-14

## 2022-08-14 PROBLEM — R93.5 ABNORMAL CT OF THE ABDOMEN: Status: RESOLVED | Noted: 2022-08-11 | Resolved: 2022-08-14

## 2022-08-14 PROBLEM — A41.9 SEPSIS WITHOUT SEPTIC SHOCK (HCC): Status: RESOLVED | Noted: 2022-08-10 | Resolved: 2022-08-14

## 2022-08-14 PROBLEM — A04.72 C. DIFFICILE COLITIS: Status: RESOLVED | Noted: 2022-08-11 | Resolved: 2022-08-14

## 2022-08-14 PROBLEM — D72.829 LEUKOCYTOSIS: Status: RESOLVED | Noted: 2022-08-11 | Resolved: 2022-08-14

## 2022-08-14 PROBLEM — E87.8 DISORDER OF FLUID OR ELECTROLYTE: Status: RESOLVED | Noted: 2022-08-10 | Resolved: 2022-08-14

## 2022-08-14 PROBLEM — Z86.19 HISTORY OF CLOSTRIDIUM DIFFICILE COLITIS: Chronic | Status: ACTIVE | Noted: 2022-08-14

## 2022-08-14 PROBLEM — E78.5 HYPERLIPIDEMIA: Status: ACTIVE | Noted: 2020-03-17

## 2022-08-14 LAB
ANION GAP SERPL CALC-SCNC: 6 MMOL/L (ref 7–16)
BUN SERPL-MCNC: 4 MG/DL (ref 8–23)
CALCIUM SERPL-MCNC: 8.7 MG/DL (ref 8.3–10.4)
CHLORIDE SERPL-SCNC: 103 MMOL/L (ref 98–107)
CO2 SERPL-SCNC: 28 MMOL/L (ref 21–32)
CREAT SERPL-MCNC: 0.52 MG/DL (ref 0.6–1)
GLUCOSE SERPL-MCNC: 133 MG/DL (ref 65–100)
MAGNESIUM SERPL-MCNC: 1.6 MG/DL (ref 1.8–2.4)
PHOSPHATE SERPL-MCNC: 1.2 MG/DL (ref 2.3–3.7)
POTASSIUM SERPL-SCNC: 3.4 MMOL/L (ref 3.5–5.1)
SODIUM SERPL-SCNC: 137 MMOL/L (ref 136–145)

## 2022-08-14 PROCEDURE — 2500000003 HC RX 250 WO HCPCS: Performed by: SURGERY

## 2022-08-14 PROCEDURE — 80048 BASIC METABOLIC PNL TOTAL CA: CPT

## 2022-08-14 PROCEDURE — 6360000002 HC RX W HCPCS: Performed by: SURGERY

## 2022-08-14 PROCEDURE — 2580000003 HC RX 258: Performed by: SURGERY

## 2022-08-14 PROCEDURE — 6360000002 HC RX W HCPCS: Performed by: FAMILY MEDICINE

## 2022-08-14 PROCEDURE — 83735 ASSAY OF MAGNESIUM: CPT

## 2022-08-14 PROCEDURE — 84100 ASSAY OF PHOSPHORUS: CPT

## 2022-08-14 PROCEDURE — 36415 COLL VENOUS BLD VENIPUNCTURE: CPT

## 2022-08-14 PROCEDURE — 6370000000 HC RX 637 (ALT 250 FOR IP): Performed by: FAMILY MEDICINE

## 2022-08-14 RX ORDER — SACCHAROMYCES BOULARDII 250 MG
250 CAPSULE ORAL 2 TIMES DAILY
Qty: 14 CAPSULE | Refills: 0 | Status: SHIPPED | OUTPATIENT
Start: 2022-08-14 | End: 2022-08-21

## 2022-08-14 RX ORDER — METRONIDAZOLE 500 MG/1
500 TABLET ORAL 3 TIMES DAILY
Qty: 9 TABLET | Refills: 0 | Status: SHIPPED | OUTPATIENT
Start: 2022-08-14 | End: 2022-08-17

## 2022-08-14 RX ADMIN — METRONIDAZOLE 500 MG: 500 INJECTION, SOLUTION INTRAVENOUS at 05:45

## 2022-08-14 RX ADMIN — Medication 250 MG: at 08:48

## 2022-08-14 RX ADMIN — MAGNESIUM SULFATE HEPTAHYDRATE 2000 MG: 40 INJECTION, SOLUTION INTRAVENOUS at 13:33

## 2022-08-14 RX ADMIN — POTASSIUM & SODIUM PHOSPHATES POWDER PACK 280-160-250 MG 250 MG: 280-160-250 PACK at 13:33

## 2022-08-14 RX ADMIN — POTASSIUM CHLORIDE 40 MEQ: 1500 TABLET, EXTENDED RELEASE ORAL at 08:48

## 2022-08-14 RX ADMIN — VANCOMYCIN HYDROCHLORIDE 125 MG: 5 INJECTION, POWDER, LYOPHILIZED, FOR SOLUTION INTRAVENOUS at 05:45

## 2022-08-14 RX ADMIN — VANCOMYCIN HYDROCHLORIDE 125 MG: 5 INJECTION, POWDER, LYOPHILIZED, FOR SOLUTION INTRAVENOUS at 12:10

## 2022-08-14 RX ADMIN — METRONIDAZOLE 500 MG: 500 INJECTION, SOLUTION INTRAVENOUS at 12:09

## 2022-08-14 NOTE — DISCHARGE SUMMARY
Hospitalist Discharge Summary   Admit Date:  8/10/2022 12:11 PM   DC Note date: 2022  Name:  Linda Beal   Age:  77 y.o. Sex:  female  :  1955   MRN:  [id number]   Room:  [facility]  PCP:  Jean Claude Salas MD    Presenting Complaint: Abdominal Pain     Initial Admission Diagnosis: Colitis [K52.9]  MILLA (acute kidney injury) ([address] [N17.9]  Leukocytosis, unspecified type [D72.829]  Acute appendicitis with generalized peritonitis, unspecified whether abscess present, unspecified whether gangrene present, unspecified whether perforation present [K35.20]  Sepsis without septic shock ([address] [A41.9]     Problem List for this Hospitalization (present on admission):    Principal Problem (Resolved):    Sepsis without septic shock ([address]  Active Problems:    Hypoproteinemia ([address]    Moderate protein-calorie malnutrition ([address]    History of Clostridium difficile colitis    Hypertension    Diabetes mellitus ([address]    Hyperlipidemia  Resolved Problems:    Disorder of fluid or electrolyte    Hyponatremia    C. difficile colitis    Infectious diarrhea    Leukocytosis    Generalized abdominal pain    Abnormal CT of the abdomen      Hospital Course:  66F PMHx hypertension, hyperlipidemia, diabetes who presented with intractable abdominal pain. She was previously seen in the emergency department 2 weeks prior with nausea and vomiting. She was started on a course of antibiotics without resolution of symptoms. Her nausea had gotten much worse she has painful abdomen with diarrhea. She was going to the bathroom approximately 4-6 times per days with loose watery stools. She was admitted for further evaluation and management. She was septic. Her C diff returned positive. She was started on empiric antibiotics. She slowly recovered. She was evaluated by surgery without indication for acute intervention. She was seen by PT, OT who recommended no needs at discharge.  She was found appropriate for discharge  with resolution of symptoms to complete antibiotics on an outpatient basis. Disposition: home with close follow up  Diet: ADULT DIET; Full Liquid  Code Status: Full Code    Follow Ups:   Follow-up Information     Luis Mccray MD. Schedule an appointment as soon as possible for a   visit in 1 week(s). Specialty: Family Medicine  Why: Transition of Care Management  Contact information:  [address] [place] [address] Road  [phone]                       Time spent in patient discharge and coordination 37 minutes. Follow up labs/diagnostics (ultimately defer to outpatient provider): Completion of antibiotics  Return to work  Resolution of symptoms    Plan was discussed with patient, family, nurse, . All questions answered. Patient was stable at time of discharge. Instructions given to call a physician or return if any concerns. Current Discharge Medication List        START taking these medications    Details   metroNIDAZOLE (FLAGYL) 500 MG tablet Take 1 tablet by mouth in the morning and 1 tablet at noon and 1 tablet before bedtime. Do all this for 3 days. Qty: 9 tablet, Refills: 0      saccharomyces boulardii (FLORASTOR) 250 MG capsule Take 1 capsule by mouth in the morning and 1 capsule before bedtime. Do all this for 7 days. Qty: 14 capsule, Refills: 0      vancomycin (VANCOCIN) 50 mg/mL oral solution Take 2.5 mLs by mouth in the morning and 2.5 mLs at noon and 2.5 mLs in the evening and 2.5 mLs before bedtime. Do all this for 7 days. Qty: 70 mL, Refills: 0           CONTINUE these medications which have NOT CHANGED    Details   rosuvastatin (CRESTOR) 5 MG tablet Take 5 mg by mouth in the morning.       ondansetron (ZOFRAN-ODT) 4 MG disintegrating tablet Take 1 tablet by mouth 3 times daily as needed for Nausea or Vomiting  Qty: 21 tablet, Refills: 0      lisinopril-hydroCHLOROthiazide (PRINZIDE;ZESTORETIC) 20-25 MG per tablet Take 1 tablet by mouth daily meloxicam (MOBIC) 7.5 MG tablet Take 7.5 mg by mouth daily as needed           STOP taking these medications       amoxicillin (AMOXIL) 500 MG capsule Comments:   Reason for Stopping:         penicillin v potassium (VEETID) 500 MG tablet Comments:   Reason for Stopping:         clindamycin (CLEOCIN) 300 MG capsule Comments:   Reason for Stopping:               Procedures done this admission:  * No surgery found *    Consults this admission:  None    Echocardiogram results:  04/28/22    TRANSTHORACIC ECHOCARDIOGRAM (TTE) COMPLETE (CONTRAST/BUBBLE/3D PRN) 04/28/2022  6:35 PM (Final)    Narrative  This is a summary report. The complete report is available in the patient's medical record. If you cannot access the medical record, please contact the sending organization for a detailed fax or copy. Left Ventricle: Normal left ventricular systolic function with a visually estimated EF of 55 - 60%. Left ventricle size is normal. Mildly increased wall thickness. Findings consistent with mild concentric hypertrophy. Normal wall motion. Normal diastolic function. Average E/e' ratio is 8. 31. Mitral Valve: Mild regurgitation with a centrally directed jet. Tricuspid Valve: Mild to moderate regurgitation. The estimated RVSP is 36 mmHgmmHg. Left Atrium: Left atrium is mildly dilated. LA Vol Index is  34 ml/m2. Signed by: Lazaro Gil MD on 4/28/2022  6:35 PM      Diagnostic Imaging/Tests:   XR ACUTE ABD SERIES CHEST 1 VW    Result Date: 7/25/2022  1. No acute finding in the chest or abdomen. CT ABDOMEN PELVIS W IV CONTRAST Additional Contrast? None    Result Date: 8/10/2022  1. Diffuse colonic wall thickening suggests an infectious/inflammatory colitis, including C. difficile. 2. Small amount of ascites. 3. Blind-ending fluid-filled tubular structure could represent a dilated appendix without surrounding inflammatory change.  There could conceivably be obstruction of the appendiceal orifice due to the extensive mucosal thickening. Secondary acute appendicitis is not excluded 4. Prominent mesenteric lymph nodes within the lower abdomen, most likely reactive. XR CHEST PORTABLE    Result Date: 8/10/2022  No acute cardiopulmonary abnormality. Recent Labs     08/11/22  0501 08/10/22  1529 08/10/22  1518 07/25/22  0920   CULTURE No Salmonella, Shigella, or Ecoli 0157 isolated.  NO GROWTH 4 DAYS NO GROWTH 4 DAYS 10,000 to 50,000 COLONIES/mL MIXED SKIN CORBY ISOLATED       Labs: Results:       BMP, Mg, Phos Recent Labs     08/12/22  0416 08/13/22  0611 08/14/22  0543    138 137   K 3.0* 3.3* 3.4*    103 103   CO2 28 29 28   ANIONGAP 8 6* 6*   BUN 13 7* 4*   CREATININE 0.53* 0.56* 0.52*   LABGLOM >60 >60 >60   GFRAA >60 >60 >60   CALCIUM 8.9 8.8 8.7   GLUCOSE 102* 120* 133*   MG 2.1  --  1.6*   PHOS 1.3* 0.7* 1.2*      CBC Recent Labs     08/12/22  0416 08/13/22  0611   WBC 18.3* 11.5*   RBC 4.54 4.75   HGB 12.0 12.6   HCT 37.1 38.4   MCV 81.7 80.8   MCH 26.4 26.5   MCHC 32.3 32.8   RDW 14.5 14.4   * 460*   MPV 8.8* 8.5*   NRBC 0.00 0.00   SEGS 72 57   LYMPHOPCT 8* 14   EOSRELPCT 0* 2   MONOPCT 17* 19*   BASOPCT 1 1   IMMGRAN 3 7*   SEGSABS 13.1* 6.6   LYMPHSABS 1.5 1.6   EOSABS 0.1 0.2   MONOSABS 3.1* 2.2*   BASOSABS 0.1 0.1   ABSIMMGRAN 0.5 0.8*      LFT Recent Labs     08/12/22 0416   BILITOT 0.4   ALKPHOS 65   AST 11*   ALT 10*   PROT 5.7*   LABALBU 1.8*   GLOB 3.9*      Cardiac  No results found for: NTPROBNP, TROPHS   Coags No results found for: PROTIME, INR, APTT   A1c No results found for: LABA1C, EAG   Lipids No results found for: CHOL, LDLCALC, LABVLDL, HDL, CHOLHDLRATIO, TRIG   Thyroid  No results found for: TSHELE, ZSN2DPP     Most Recent UA Lab Results   Component Value Date/Time    COLORU DARK YELLOW 07/25/2022 09:20 AM    APPEARANCE CLEAR 07/25/2022 09:20 AM    SPECGRAV 1.029 07/25/2022 09:20 AM    LABPH 5.5 07/25/2022 09:20 AM    PROTEINU 30 07/25/2022 09:20 AM    GLUCOSEU Negative 07/25/2022 09:20 AM    KETUA 15 07/25/2022 09:20 AM    BILIRUBINUR MODERATE 07/25/2022 09:20 AM    BLOODU Negative 07/25/2022 09:20 AM    UROBILINOGEN 1.0 07/25/2022 09:20 AM    NITRU Negative 07/25/2022 09:20 AM    LEUKOCYTESUR TRACE 07/25/2022 09:20 AM    WBCUA 0-3 07/25/2022 09:20 AM    RBCUA 0-3 07/25/2022 09:20 AM    EPITHUA 0-3 07/25/2022 09:20 AM    BACTERIA TRACE 07/25/2022 09:20 AM    LABCAST HYALINE 07/25/2022 09:20 AM    MUCUS 4+ 07/25/2022 09:20 AM          All Labs from Last 24 Hrs:  Recent Results (from the past 24 hour(s))   Basic Metabolic Panel    Collection Time: 08/14/22  5:43 AM   Result Value Ref Range    Sodium 137 136 - 145 mmol/L    Potassium 3.4 (L) 3.5 - 5.1 mmol/L    Chloride 103 98 - 107 mmol/L    CO2 28 21 - 32 mmol/L    Anion Gap 6 (L) 7 - 16 mmol/L    Glucose 133 (H) 65 - 100 mg/dL    BUN 4 (L) 8 - 23 MG/DL    Creatinine 0.52 (L) 0.6 - 1.0 MG/DL    GFR African American >60 >60 ml/min/1.73m2    GFR Non- >60 >60 ml/min/1.73m2    Calcium 8.7 8.3 - 10.4 MG/DL   Magnesium    Collection Time: 08/14/22  5:43 AM   Result Value Ref Range    Magnesium 1.6 (L) 1.8 - 2.4 mg/dL   Phosphorus    Collection Time: 08/14/22  5:43 AM   Result Value Ref Range    Phosphorus 1.2 (L) 2.3 - 3.7 MG/DL       Allergies   Allergen Reactions    Chocolate Other (See Comments)     Patient request Chocolate added as allergy. There is no immunization history on file for this patient.     Recent Vital Data:  Patient Vitals for the past 24 hrs:   Temp Pulse Resp BP SpO2   08/14/22 1049 97.7 °F (36.5 °C) 76 16 (!) 145/80 97 %   08/14/22 0817 97.5 °F (36.4 °C) 83 20 127/85 97 %   08/14/22 0439 97.5 °F (36.4 °C) 86 18 133/88 97 %   08/14/22 0016 97.5 °F (36.4 °C) 72 17 (!) 140/86 98 %   08/13/22 2050 97.7 °F (36.5 °C) 84 18 (!) 140/95 98 %   08/13/22 1554 97.7 °F (36.5 °C) 82 18 135/78 99 %   08/13/22 1151 97.5 °F (36.4 °C) 78 18 126/82 99 %       Oxygen Therapy  SpO2: 97 %  O2 Device: None (Room air)    Estimated body mass index is 27.95 kg/m² as calculated from the following:    Height as of this encounter: 4' 11\" (1.499 m). Weight as of this encounter: 138 lb 6.4 oz (62.8 kg). No intake or output data in the 24 hours ending 08/14/22 1143    Physical Exam  Vitals and nursing note reviewed. Constitutional:       General: She is not in acute distress. Appearance: She is not ill-appearing or diaphoretic. Eyes:      Extraocular Movements: Extraocular movements intact. Cardiovascular:      Rate and Rhythm: Normal rate and regular rhythm. Pulmonary:      Effort: Pulmonary effort is normal. No respiratory distress. Abdominal:      General: Abdomen is flat. Bowel sounds are normal. There is no distension. Palpations: Abdomen is soft. Tenderness: There is no abdominal tenderness. There is no guarding. Musculoskeletal:         General: No deformity. Skin:     Coloration: Skin is not jaundiced or pale. Neurological:      General: No focal deficit present. Mental Status: She is alert and oriented to person, place, and time. Psychiatric:         Mood and Affect: Mood normal. Mood is not depressed. Behavior: Behavior normal. Behavior is cooperative.          Cognition and Memory: Cognition normal.       Signed:  Kari Orr MD

## 2022-08-14 NOTE — PLAN OF CARE
Problem: Discharge Planning  Goal: Discharge to home or other facility with appropriate resources  8/14/2022 1454 by Dolores Gonzalez RN  Outcome: Completed  Flowsheets (Taken 8/14/2022 0740)  Discharge to home or other facility with appropriate resources: Identify barriers to discharge with patient and caregiver  8/14/2022 0515 by Brian Land RN  Outcome: Progressing  Flowsheets (Taken 8/13/2022 1540 by Dolores Gnozalez RN)  Discharge to home or other facility with appropriate resources: Identify barriers to discharge with patient and caregiver     Problem: Pain  Goal: Verbalizes/displays adequate comfort level or baseline comfort level  8/14/2022 1454 by Dolores Gonzalez RN  Outcome: Completed  Flowsheets  Taken 8/14/2022 1049  Verbalizes/displays adequate comfort level or baseline comfort level: Encourage patient to monitor pain and request assistance  Taken 8/14/2022 0730  Verbalizes/displays adequate comfort level or baseline comfort level: Encourage patient to monitor pain and request assistance  8/14/2022 0515 by Brian Land RN  Outcome: Progressing     Problem: Chronic Conditions and Co-morbidities  Goal: Patient's chronic conditions and co-morbidity symptoms are monitored and maintained or improved  Outcome: Completed  Flowsheets (Taken 8/14/2022 0740)  Care Plan - Patient's Chronic Conditions and Co-Morbidity Symptoms are Monitored and Maintained or Improved: Monitor and assess patient's chronic conditions and comorbid symptoms for stability, deterioration, or improvement     Problem: Safety - Adult  Goal: Free from fall injury  8/14/2022 1454 by Dolores Gonzalez RN  Outcome: Completed  Flowsheets (Taken 8/14/2022 0743)  Free From Fall Injury: Instruct family/caregiver on patient safety  8/14/2022 0515 by Brian Land RN  Outcome: Progressing

## 2022-08-14 NOTE — CARE COORDINATION
SW met with patient to discuss dispo. Patient agreeable to [facility], preference is for [address] [place] [address]. Referral sent. Update: Patient's insurance is not in network with [address] [place], [place] [address], patient's choice based on CMS ratings so referral forwarded to [facility].      Brittany Grigsby, LYNDSEY    [facility]

## 2022-08-14 NOTE — PLAN OF CARE
Problem: Discharge Planning  Goal: Discharge to home or other facility with appropriate resources  Outcome: Progressing  Flowsheets (Taken 8/13/2022 1540 by Paulina Castano RN)  Discharge to home or other facility with appropriate resources: Identify barriers to discharge with patient and caregiver     Problem: Pain  Goal: Verbalizes/displays adequate comfort level or baseline comfort level  Outcome: Progressing     Problem: Safety - Adult  Goal: Free from fall injury  Outcome: Progressing

## 2022-08-14 NOTE — PROGRESS NOTES
H&P/Consult Note/Progress Note/Office Note:   Linda Beal  MRN: [id number]  :1955  Age:66 y.o.    HPI: Linda Beal is a 77 y.o. female who was admitted septic due to C diff colitis           Past Medical History:   Diagnosis Date    Diabetes mellitus ([facility] [address]     not on medications hGB a1C 6.5    Endometriosis     total hysterectomy w/o BSO    Hyperlipidemia     Hypertension     not taking medication    Menopause      Past Surgical History:   Procedure Laterality Date    BREAST BIOPSY      BREAST SURGERY      benign lump    HYSTERECTOMY (CERVIX STATUS UNKNOWN)      Total for endometriosis    HYSTERECTOMY, TOTAL ABDOMINAL (CERVIX REMOVED)      ovaries intact     Current Facility-Administered Medications   Medication Dose Route Frequency    vancomycin (VANCOCIN) oral solution 125 mg  125 mg Oral 4 times per day    dextrose 5 % and 0.45 % NaCl with KCl 20 mEq infusion   IntraVENous Continuous    saccharomyces boulardii (FLORASTOR) capsule 250 mg  250 mg Oral BID    metronidazole (FLAGYL) 500 mg in 0.9% NaCl 100 mL IVPB premix  500 mg IntraVENous Q6H    sodium chloride flush 0.9 % injection 5-40 mL  5-40 mL IntraVENous PRN    ondansetron (ZOFRAN-ODT) disintegrating tablet 4 mg  4 mg Oral Q8H PRN    Or    ondansetron (ZOFRAN) injection 4 mg  4 mg IntraVENous Q6H PRN    acetaminophen (TYLENOL) tablet 650 mg  650 mg Oral Q6H PRN    potassium chloride (KLOR-CON M) extended release tablet 40 mEq  40 mEq Oral PRN    Or    potassium bicarb-citric acid (EFFER-K) effervescent tablet 40 mEq  40 mEq Oral PRN    Or    potassium chloride 10 mEq/100 mL IVPB (Peripheral Line)  10 mEq IntraVENous PRN    magnesium sulfate 2000 mg in 50 mL IVPB premix  2,000 mg IntraVENous PRN    sodium phosphate 10 mmol in sodium chloride 0.9 % 250 mL IVPB  10 mmol IntraVENous PRN    Or    sodium phosphate 15 mmol in sodium chloride 0.9 % 250 mL IVPB  15 mmol IntraVENous PRN    Or    sodium phosphate 20 mmol in sodium chloride 0.9 % 500 mL IVPB  20 mmol IntraVENous PRN    morphine (PF) injection 2 mg  2 mg IntraVENous Q4H PRN     ALLERGIES:  Chocolate    Social History     Socioeconomic History    Marital status: Single     Spouse name: None    Number of children: None    Years of education: None    Highest education level: None   Tobacco Use    Smoking status: Never    Smokeless tobacco: Never   Substance and Sexual Activity    Alcohol use: No    Drug use: No     Social History     Tobacco Use   Smoking Status Never   Smokeless Tobacco Never     Family History   Problem Relation Age of Onset    Colon Cancer Neg Hx     Ovarian Cancer Neg Hx     Breast Cancer Neg Hx     No Known Problems Brother     No Known Problems Brother     No Known Problems Sister     No Known Problems Father     Osteoarthritis Mother     Uterine Cancer Neg Hx      ROS: The patient has no difficulty with chest pain or shortness of breath. No fever or chills. Comprehensive review of systems was otherwise unremarkable except as noted above. Physical Exam:   /85   Pulse 83   Temp 97.5 °F (36.4 °C) (Oral)   Resp 20   Ht 4' 11\" (1.499 m)   Wt 138 lb 6.4 oz (62.8 kg)   SpO2 97%   BMI 27.95 kg/m²   Vitals:    08/13/22 2050 08/14/22 0016 08/14/22 0439 08/14/22 0817   BP: (!) 140/95 (!) 140/86 133/88 127/85   Pulse: 84 72 86 83   Resp: 18 17 18 20   Temp: 97.7 °F (36.5 °C) 97.5 °F (36.4 °C) 97.5 °F (36.4 °C) 97.5 °F (36.4 °C)   TempSrc: Oral Oral Oral Oral   SpO2: 98% 98% 97% 97%   Weight:       Height:         No intake/output data recorded. No intake/output data recorded. Constitutional: Alert, oriented, cooperative patient in no acute distress; appears stated age    Eyes:Sclera are clear. EOMs intact  ENMT: no external lesions gross hearing normal; no obvious neck masses, no ear or lip lesions, nares normal  CV: RRR. Normal perfusion  Resp: No JVD. Breathing is  non-labored; no audible wheezing.     GI: soft and non-distended Musculoskeletal: unremarkable with normal function. No embolic signs or cyanosis. Neuro:  Oriented; moves all 4; no focal deficits  Psychiatric: normal affect and mood, no memory impairment    Recent vitals (if inpt):  Patient Vitals for the past 24 hrs:   BP Temp Temp src Pulse Resp SpO2   08/14/22 0817 127/85 97.5 °F (36.4 °C) Oral 83 20 97 %   08/14/22 0439 133/88 97.5 °F (36.4 °C) Oral 86 18 97 %   08/14/22 0016 (!) 140/86 97.5 °F (36.4 °C) Oral 72 17 98 %   08/13/22 2050 (!) 140/95 97.7 °F (36.5 °C) Oral 84 18 98 %   08/13/22 1554 135/78 97.7 °F (36.5 °C) Oral 82 18 99 %   08/13/22 1151 126/82 97.5 °F (36.4 °C) Oral 78 18 99 %       Amount and/or Complexity of Data Reviewed and Analyzed:  I reviewed and analyzed all of the unique labs and radiologic studies that are shown below as well as any that are in the HPI, and any that are in the expanded problem list below  *Each unique test, order, or document contributes to the combination of 2 or combination of 3 in Category 1 below. For this visit I also reviewed old records and prior notes.       Recent Labs     08/12/22  0416 08/13/22  0611 08/14/22  0543   WBC 18.3* 11.5*  --    HGB 12.0 12.6  --    * 460*  --     138 137   K 3.0* 3.3* 3.4*    103 103   CO2 28 29 28   BUN 13 7* 4*   ALT 10*  --   --      Review of most recent CBC  Lab Results   Component Value Date    WBC 11.5 (H) 08/13/2022    HGB 12.6 08/13/2022    HCT 38.4 08/13/2022    MCV 80.8 08/13/2022     (H) 08/13/2022       Review of most recent BMP  Lab Results   Component Value Date/Time     08/14/2022 05:43 AM    K 3.4 08/14/2022 05:43 AM     08/14/2022 05:43 AM    CO2 28 08/14/2022 05:43 AM    BUN 4 08/14/2022 05:43 AM    CREATININE 0.52 08/14/2022 05:43 AM    GLUCOSE 133 08/14/2022 05:43 AM    CALCIUM 8.7 08/14/2022 05:43 AM        Review of most recent LFTs (and lipase if done)  Lab Results   Component Value Date    ALT 10 (L) 08/12/2022    AST 11 (L) 08/12/2022    ALKPHOS 65 08/12/2022    BILITOT 0.4 08/12/2022     Lab Results   Component Value Date    LIPASE 31 (L) 08/10/2022       No results found for: INR, APTT, LCAD    Review of most recent HgbA1c  No results found for: LABA1C  No results found for: EAG    Nutritional assessment screen for wound healing issues:  Lab Results   Component Value Date    LABALBU 1.8 (L) 08/12/2022       @lastcovr@    Xray Result (most recent):  XR CHEST PORTABLE 08/10/2022    Narrative  EXAM: XR CHEST PORTABLE  INDICATION: sepsis  COMPARISON: Chest radiograph, 1/16/2016    FINDINGS:  The cardiomediastinal silhouette is within normal limits. No focal parenchymal  process. No pleural effusion. No pneumothorax. No acute osseous abnormality. Impression  No acute cardiopulmonary abnormality. CT Result (most recent):  CT ABDOMEN PELVIS W IV CONTRAST 08/10/2022    Narrative  CT ABDOMEN AND PELVIS WITH CONTRAST    HISTORY:  Abdominal pain and vomiting x2 weeks. Infrequent bowel movements. TECHNIQUE: Helically acquired images were obtained from the domes of the  diaphragms to the ischial tuberosities reconstructed at 5mm intervals after the  uneventful administration of 100c's of intravenous Isovue-370 in order to better  evaluate the solid abdominal viscera and vascular structures. Oral contrast was  not administered per the emergency imaging BMI protocol. Coronal and sagittal  reformatted images were submitted. Radiation dose reduction techniques were used for this study:  Our CT scanners  use one or all of the following: Automated exposure control, adjustment of the  mA and/or kVp according to patient's size, iterative reconstruction. COMPARISON: None. Correlation is made to the abdominal series 07/25/2022. CT ABDOMEN: There is no hepatic or splenic lesion. The pancreas and adrenal  glands unremarkable. The kidneys enhance symmetrically without discrete  abnormality.     There is a small amount of ascites within the right abdomen. There is diffuse  relatively extensive colonic wall thickening, particularly of the ascending  colon. There is no small bowel dilatation. There are few mesenteric lymph nodes  one of which measures up to 17 mm in short axis within the lower mid abdomen. There is a fluid filled tubular structure extending superiorly from the cecum. This could represent a dilated appendix measuring up to 16 mm in diameter  although there are no surrounding inflammatory changes. CT PELVIS: Continued wall thickening of the colon is present to the level of the  rectum. The pelvic structures are unremarkable. No aggressive osseous lesion is  present. Impression  1. Diffuse colonic wall thickening suggests an infectious/inflammatory colitis,  including C. difficile. 2. Small amount of ascites. 3. Blind-ending fluid-filled tubular structure could represent a dilated  appendix without surrounding inflammatory change. There could conceivably be  obstruction of the appendiceal orifice due to the extensive mucosal thickening. Secondary acute appendicitis is not excluded  4. Prominent mesenteric lymph nodes within the lower abdomen, most likely  reactive. US Result (most recent):  US BREAST LIMITED 03/11/2021    Narrative  This is a summary report. The complete report is available in the patient's medical record. If you cannot access the medical record, please contact the sending organization for a detailed fax or copy. BILATERAL DIAGNOSTIC DIGITAL MAMMOGRAPHY WITH TOMOSYNTHESIS,  BILATERAL BREAST SONOGRAPHY:    CLINICAL HISTORY:  72year-old status post prior benign left surgical biopsy  presents for evaluation of intermittent pain laterally in the left breast for  approximately 6 months. COMPARISON:  The patient reports prior outside mammograms, but none could be  located for comparison at this time.     BILATERAL MAMMOGRAM:  No palpable lump was reported for placement of a skin  marker at the time of List    Diagnosis Date Noted    Hypoproteinemia ([facility] 08/11/2022     Priority: Medium    C. difficile colitis 08/11/2022     Priority: Medium    Infectious diarrhea 08/11/2022     Priority: Medium    Leukocytosis 08/11/2022     Priority: Medium    Generalized abdominal pain 08/11/2022     Priority: Medium    Abnormal CT of the abdomen 08/11/2022     Priority: Medium    Sepsis without septic shock ([facility] 08/10/2022     Priority: Medium    Disorder of fluid or electrolyte 08/10/2022     Priority: Medium    Hyponatremia 08/10/2022     Priority: Medium    Hypertension      not taking medication        Diabetes mellitus ([facility]     Hyperlipidemia         A/P  Improving C diff colitis  No abdominal pain, tolerating diet, afebrile  BM getting thicker.  WBC 11K    - continue current treatment

## 2022-08-14 NOTE — PROGRESS NOTES
Discharge instructions reviewed with patient and niece. All needs assessed at this time. All paper prescriptions given to patient.

## 2022-08-15 LAB
BACTERIA SPEC CULT: NORMAL
BACTERIA SPEC CULT: NORMAL
SERVICE CMNT-IMP: NORMAL
SERVICE CMNT-IMP: NORMAL

## 2022-08-19 NOTE — CARE COORDINATION
JUANITA received a message from [place] with [facility] stating that the patient declined services. Patient relations reached out to JUANITA regarding the patient's family reporting she could not afford her vancomycin. Patient has Medicare so SW cannot provide a tony voucher, reached out to hospitalists on duty to see if they're able to write the patient for something more affordable that is medically appropriate.      Rosalind Juares LMSW    [address] [facility]

## 2022-08-19 NOTE — CARE COORDINATION
Call to patient to confirm information her niece provided to patient relations and ensure that she's unable to obtain her medications. DO can provide an alternative prescription if so. No answer, LVM requesting a call back.      Zita Barrios LMSW    [address] [facility]

## 2023-06-02 ENCOUNTER — HOSPITAL ENCOUNTER (OUTPATIENT)
Dept: MAMMOGRAPHY | Age: 68
End: 2023-06-02
Payer: MEDICARE

## 2023-06-02 DIAGNOSIS — Z12.31 OTHER SCREENING MAMMOGRAM: ICD-10-CM

## 2023-06-02 PROCEDURE — 77063 BREAST TOMOSYNTHESIS BI: CPT
